# Patient Record
Sex: FEMALE | Race: WHITE | NOT HISPANIC OR LATINO | Employment: FULL TIME | URBAN - METROPOLITAN AREA
[De-identification: names, ages, dates, MRNs, and addresses within clinical notes are randomized per-mention and may not be internally consistent; named-entity substitution may affect disease eponyms.]

---

## 2018-09-02 ENCOUNTER — OFFICE VISIT (OUTPATIENT)
Dept: URGENT CARE | Facility: CLINIC | Age: 38
End: 2018-09-02
Payer: COMMERCIAL

## 2018-09-02 VITALS
WEIGHT: 239 LBS | BODY MASS INDEX: 40.8 KG/M2 | HEART RATE: 80 BPM | RESPIRATION RATE: 16 BRPM | TEMPERATURE: 98 F | DIASTOLIC BLOOD PRESSURE: 83 MMHG | HEIGHT: 64 IN | OXYGEN SATURATION: 100 % | SYSTOLIC BLOOD PRESSURE: 143 MMHG

## 2018-09-02 DIAGNOSIS — R30.0 DYSURIA: ICD-10-CM

## 2018-09-02 DIAGNOSIS — K52.9 GASTROENTERITIS: Primary | ICD-10-CM

## 2018-09-02 LAB
BILIRUB UR QL STRIP: NEGATIVE
GLUCOSE UR QL STRIP: NEGATIVE
KETONES UR QL STRIP: POSITIVE
LEUKOCYTE ESTERASE UR QL STRIP: NEGATIVE
PH, POC UA: 7 (ref 5–8)
POC BLOOD, URINE: NEGATIVE
POC NITRATES, URINE: NEGATIVE
PROT UR QL STRIP: NEGATIVE
SP GR UR STRIP: 1.01 (ref 1–1.03)
UROBILINOGEN UR STRIP-ACNC: ABNORMAL (ref 0.1–1.1)

## 2018-09-02 PROCEDURE — 81003 URINALYSIS AUTO W/O SCOPE: CPT | Mod: QW,S$GLB,, | Performed by: INTERNAL MEDICINE

## 2018-09-02 PROCEDURE — 99203 OFFICE O/P NEW LOW 30 MIN: CPT | Mod: 25,S$GLB,, | Performed by: INTERNAL MEDICINE

## 2018-09-02 RX ORDER — HYDROXYZINE PAMOATE 50 MG/1
50 CAPSULE ORAL EVERY 8 HOURS PRN
Qty: 25 CAPSULE | Refills: 0 | Status: SHIPPED | OUTPATIENT
Start: 2018-09-02 | End: 2018-12-27 | Stop reason: ALTCHOICE

## 2018-09-02 RX ORDER — SULFAMETHOXAZOLE AND TRIMETHOPRIM 800; 160 MG/1; MG/1
1 TABLET ORAL 2 TIMES DAILY
Qty: 10 TABLET | Refills: 0 | Status: SHIPPED | OUTPATIENT
Start: 2018-09-02 | End: 2018-09-07

## 2018-09-02 RX ORDER — ONDANSETRON 4 MG/1
4 TABLET, FILM COATED ORAL DAILY PRN
Qty: 10 TABLET | Refills: 0 | Status: SHIPPED | OUTPATIENT
Start: 2018-09-02 | End: 2018-12-27 | Stop reason: ALTCHOICE

## 2018-09-02 NOTE — PATIENT INSTRUCTIONS
Abdominal Pain    Abdominal pain is pain in the stomach or belly area. Everyone has this pain from time to time. In many cases it goes away on its own. But abdominal pain can sometimes be due to a serious problem, such as appendicitis. So its important to know when to seek help.  Causes of abdominal pain  There are many possible causes of abdominal pain. Common causes in adults include:  · Constipation, diarrhea, or gas  · Stomach acid flowing back up into the esophagus (acid reflux or heartburn)  · Severe acid reflux, called GERD (gastroesophageal reflux disease)  · A sore in the lining of the stomach or small intestine (peptic ulcer)  · Inflammation of the gallbladder, liver, or pancreas  · Gallstones or kidney stones  · Appendicitis   · Intestinal blockage   · An internal organ pushing through a muscle or other tissue (hernia)  · Urinary tract infections  · In women, menstrual cramps, fibroids, or endometriosis  · Inflammation or infection of the intestines  Diagnosing the cause of abdominal pain  Your healthcare provider will do a physical exam help find the cause of your pain. If needed, tests will be ordered. Belly pain has many possible causes. So it can be hard to find the reason for your pain. Giving details about your pain can help. Tell your provider where and when you feel the pain, and what makes it better or worse. Also let your provider know if you have other symptoms such as:  · Fever  · Tiredness  · Upset stomach (nausea)  · Vomiting  · Changes in bathroom habits  Treating abdominal pain  Some causes of pain need emergency medical treatment right away. These include appendicitis or a bowel blockage. Other problems can be treated with rest, fluids, or medicines. Your healthcare provider can give you specific instructions for treatment or self-care based on what is causing your pain.  If you have vomiting or diarrhea, sip water or other clear fluids. When you are ready to eat solid foods again,  start with small amounts of easy-to-digest, low-fat foods. These include apple sauce, toast, or crackers.   When to seek medical care  Call 911 or go to the hospital right away if you:  · Cant pass stool and are vomiting  · Are vomiting blood or have bloody diarrhea or black, tarry diarrhea  · Have chest, neck, or shoulder pain  · Feel like you might pass out  · Have pain in your shoulder blades with nausea  · Have sudden, severe belly pain  · Have new, severe pain unlike any you have felt before  · Have a belly that is rigid, hard, and tender to touch  Call your healthcare provider if you have:  · Pain for more than 5 days  · Bloating for more than 2 days  · Diarrhea for more than 5 days  · A fever of 100.4°F (38.0°C) or higher, or as directed by your provider  · Pain that gets worse  · Weight loss for no reason  · Continued lack of appetite  · Blood in your stool  How to prevent abdominal pain  Here are some tips to help prevent abdominal pain:  · Eat smaller amounts of food at one time.  · Avoid greasy, fried, or other high-fat foods.  · Avoid foods that give you gas.  · Exercise regularly.  · Drink plenty of fluids.  To help prevent GERD symptoms:  · Quit smoking.  · Reduce alcohol and certain foods that increase stomach acid.  · Avoid aspirin and over-the-counter pain and fever medicines (NSAIDS or nonsteroidal anti-inflammatory drugs), if possible  · Lose extra weight.  · Finish eating at least 2 hours before you go to bed or lie down.  · Raise the head of your bed.  Date Last Reviewed: 7/1/2016  © 9085-0929 [employer]. [address] [place] [address], [place] [address]. All rights reserved. This information is not intended as a substitute for professional medical care. Always follow your healthcare professional's instructions.  Please return here or go to the Emergency Department for any concerns or worsening of condition.  If you were prescribed antibiotics, please take them to completion.  If you  were prescribed a narcotic medication, do not drive or operate heavy equipment or machinery while taking these medications.  Please follow up with your primary care doctor or specialist as needed.    If you  smoke, please stop smoking.  1) Do not eat any solid foods until nausea, and vomiting have gone away for 16 hours.  2) You do not have to eat solid foods for a month, but you must drink liquids daily.  3) If not eating solid food you must drink liquids that have sugar in it, such as Gatorade and 7up (these two are very good for a sore stomach). Fluids with sugars will keep you from having a hunger headache, these associated with low blood sugars.  4) If no nausea and vomiting for 16 hours you may advance your diet to the well known BRAT diet (bananas, rice/mash potatoes [not spicy], apple sauce, and toast or crackers).  5) If diarrhea is present then do not eat fruits (especally apple sauce).  6) Diarrhea must pass, attempts to slow down the diarrhea can increase the length of the illness. So, to ensure that your bottom does not get chafed (the reason for this is because diarrhea is an acidic fluid) you must use a barrier cream such as Jarod's Butt Paste.  7) You must practice GOOD HAND HYGIENE(wwjd) in order not to spread this to others ESPECIALLY your FAMILY as this can cause a [place] OF DIARRHEA.

## 2018-09-02 NOTE — PROGRESS NOTES
"Subjective:       Patient ID: Sheryl Klein is a 38 y.o. female.    Vitals:  height is 5' 4" (1.626 m) and weight is 108.4 kg (239 lb). Her oral temperature is 97.8 °F (36.6 °C). Her blood pressure is 143/83 (abnormal) and her pulse is 80. Her respiration is 16 and oxygen saturation is 100%.     Chief Complaint: Abdominal Pain    Abdominal Pain   This is a new problem. The current episode started in the past 7 days. The problem occurs intermittently. The problem has been unchanged. The pain is located in the generalized abdominal region. The pain is at a severity of 7/10. The pain is moderate. The quality of the pain is aching. The abdominal pain does not radiate. Nothing aggravates the pain. The pain is relieved by nothing. She has tried antibiotics for the symptoms. The treatment provided no relief.     Review of Systems   Gastrointestinal: Positive for bloating and abdominal pain.       Objective:      Physical Exam   Constitutional: She is oriented to person, place, and time. She appears well-developed and well-nourished. She is cooperative.  Non-toxic appearance. She does not appear ill. No distress.   HENT:   Head: Normocephalic and atraumatic.   Right Ear: Hearing, tympanic membrane, external ear and ear canal normal.   Left Ear: Hearing, tympanic membrane, external ear and ear canal normal.   Nose: Nose normal. No mucosal edema, rhinorrhea or nasal deformity. No epistaxis. Right sinus exhibits no maxillary sinus tenderness and no frontal sinus tenderness. Left sinus exhibits no maxillary sinus tenderness and no frontal sinus tenderness.   Mouth/Throat: Uvula is midline, oropharynx is clear and moist and mucous membranes are normal. No trismus in the jaw. Normal dentition. No uvula swelling. No posterior oropharyngeal erythema.   Eyes: Conjunctivae and lids are normal. No scleral icterus.   Sclera clear bilat   Neck: Trachea normal, full passive range of motion without pain and phonation normal. Neck " supple.   Cardiovascular: Normal rate, regular rhythm, normal heart sounds, intact distal pulses and normal pulses.   Pulmonary/Chest: Effort normal and breath sounds normal. No respiratory distress.   Abdominal: Soft. Normal appearance and bowel sounds are normal. She exhibits no distension, no abdominal bruit, no pulsatile midline mass and no mass. There is no hepatosplenomegaly. There is no tenderness. There is no rigidity, no rebound, no guarding, no CVA tenderness, no tenderness at McBurney's point and negative Aquino's sign. No hernia.       Musculoskeletal: Normal range of motion. She exhibits no edema or deformity.   Neurological: She is alert and oriented to person, place, and time. She has normal strength. She exhibits normal muscle tone. Coordination normal.   Skin: Skin is warm, dry and intact. She is not diaphoretic. No pallor.   Psychiatric: She has a normal mood and affect. Her speech is normal and behavior is normal. Judgment and thought content normal. Cognition and memory are normal.   Nursing note and vitals reviewed.      Assessment:       1. Gastroenteritis        Plan:         Gastroenteritis  -     X-Ray Abdomen Flat And Erect; Future; Expected date: 09/02/2018

## 2018-09-05 ENCOUNTER — TELEPHONE (OUTPATIENT)
Dept: URGENT CARE | Facility: CLINIC | Age: 38
End: 2018-09-05

## 2018-12-27 ENCOUNTER — OFFICE VISIT (OUTPATIENT)
Dept: URGENT CARE | Facility: CLINIC | Age: 38
End: 2018-12-27
Payer: COMMERCIAL

## 2018-12-27 VITALS
HEART RATE: 83 BPM | SYSTOLIC BLOOD PRESSURE: 167 MMHG | WEIGHT: 224 LBS | BODY MASS INDEX: 38.24 KG/M2 | RESPIRATION RATE: 16 BRPM | TEMPERATURE: 99 F | DIASTOLIC BLOOD PRESSURE: 96 MMHG | OXYGEN SATURATION: 96 % | HEIGHT: 64 IN

## 2018-12-27 DIAGNOSIS — J01.10 ACUTE FRONTAL SINUSITIS, RECURRENCE NOT SPECIFIED: Primary | ICD-10-CM

## 2018-12-27 DIAGNOSIS — H65.02 ACUTE SEROUS OTITIS MEDIA OF LEFT EAR, RECURRENCE NOT SPECIFIED: ICD-10-CM

## 2018-12-27 PROCEDURE — 99214 OFFICE O/P EST MOD 30 MIN: CPT | Mod: S$GLB,,, | Performed by: INTERNAL MEDICINE

## 2018-12-27 RX ORDER — PREDNISONE 5 MG/1
5 TABLET ORAL DAILY
Qty: 10 TABLET | Refills: 0 | Status: SHIPPED | OUTPATIENT
Start: 2018-12-27 | End: 2019-06-01

## 2018-12-27 RX ORDER — CLINDAMYCIN HYDROCHLORIDE 75 MG/1
150 CAPSULE ORAL EVERY 6 HOURS
COMMUNITY
End: 2019-06-01

## 2018-12-27 NOTE — PROGRESS NOTES
"Subjective:       Patient ID: Sheryl Klein is a 38 y.o. female.    Vitals:  height is 5' 4" (1.626 m) and weight is 101.6 kg (224 lb). Her oral temperature is 99.1 °F (37.3 °C). Her blood pressure is 167/96 (abnormal) and her pulse is 83. Her respiration is 16 and oxygen saturation is 96%.     Chief Complaint: Cough    Cough   This is a new problem. The current episode started in the past 7 days. The problem has been gradually worsening. The cough is productive of purulent sputum. Associated symptoms include postnasal drip. Pertinent negatives include no chills, ear pain, eye redness, fever, hemoptysis, myalgias, rash, sore throat, shortness of breath or wheezing. Nothing aggravates the symptoms. The treatment provided no relief.       Constitution: Negative for chills, sweating, fatigue and fever.   HENT: Positive for postnasal drip. Negative for ear pain, congestion, sinus pain, sinus pressure, sore throat and voice change.    Neck: Negative for painful lymph nodes.   Eyes: Negative for eye redness.   Respiratory: Positive for cough and sputum production. Negative for chest tightness, bloody sputum, COPD, shortness of breath, stridor, wheezing and asthma.    Gastrointestinal: Negative for nausea and vomiting.   Musculoskeletal: Negative for muscle ache.   Skin: Negative for rash.   Allergic/Immunologic: Negative for seasonal allergies and asthma.   Hematologic/Lymphatic: Negative for swollen lymph nodes.       Objective:      Physical Exam   Constitutional: She is oriented to person, place, and time. She appears well-developed and well-nourished. She is cooperative.  Non-toxic appearance. She does not appear ill. No distress.   HENT:   Head: Normocephalic and atraumatic.   Right Ear: Hearing, tympanic membrane, external ear and ear canal normal. Tympanic membrane is not injected and not erythematous.   Left Ear: Hearing, external ear and ear canal normal. Tympanic membrane is injected and erythematous.   Nose: " Mucosal edema and rhinorrhea present. No nasal deformity. No epistaxis. Right sinus exhibits frontal sinus tenderness. Right sinus exhibits no maxillary sinus tenderness. Left sinus exhibits frontal sinus tenderness. Left sinus exhibits no maxillary sinus tenderness.   Mouth/Throat: Uvula is midline and mucous membranes are normal. No trismus in the jaw. Normal dentition. No uvula swelling. Posterior oropharyngeal edema (mild ) present. No oropharyngeal exudate or posterior oropharyngeal erythema.       Eyes: Conjunctivae and lids are normal. No scleral icterus.   Sclera clear bilat   Neck: Trachea normal, full passive range of motion without pain and phonation normal. Neck supple.   Cardiovascular: Normal rate, regular rhythm, normal heart sounds, intact distal pulses and normal pulses.   Pulmonary/Chest: Effort normal and breath sounds normal. No respiratory distress.   Abdominal: Soft. Normal appearance and bowel sounds are normal. She exhibits no distension. There is no tenderness.   Musculoskeletal: Normal range of motion. She exhibits no edema or deformity.   Neurological: She is alert and oriented to person, place, and time. She exhibits normal muscle tone. Coordination normal.   Skin: Skin is warm, dry and intact. She is not diaphoretic. No pallor.   Psychiatric: She has a normal mood and affect. Her speech is normal and behavior is normal. Judgment and thought content normal. Cognition and memory are normal.   Nursing note and vitals reviewed.      Assessment:       1. Acute frontal sinusitis, recurrence not specified    2. Acute serous otitis media of left ear, recurrence not specified        Plan:         Acute frontal sinusitis, recurrence not specified  -     predniSONE (DELTASONE) 5 MG tablet; Take 1 tablet (5 mg total) by mouth once daily.  Dispense: 10 tablet; Refill: 0    Acute serous otitis media of left ear, recurrence not specified  -     predniSONE (DELTASONE) 5 MG tablet; Take 1 tablet (5 mg  total) by mouth once daily.  Dispense: 10 tablet; Refill: 0       take meds

## 2018-12-27 NOTE — PATIENT INSTRUCTIONS
Acute Bacterial Rhinosinusitis (ABRS)    Acute bacterial rhinosinusitis (ABRS) is an infection of your nasal cavity and sinuses. Its caused by bacteria. Acute means that youve had symptoms for less than 12 weeks.  Understanding your sinuses  The nasal cavity is the large air-filled space behind your nose. The sinuses are a group of spaces formed by the bones of your face. They connect with your nasal cavity. ABRS causes the tissue lining these spaces to become inflamed. Mucus may not drain normally. This leads to facial pain and other symptoms.  What causes ABRS?  ABRS most often follows an upper respiratory infection caused by a virus. Bacteria then infect the lining of your nasal cavity and sinuses. But you can also get ABRS if you have:  · Nasal allergies  · Long-term nasal swelling and congestion not caused by allergies  · Blockage in the nose  Symptoms of ABRS  The symptoms of ABRS may be different for each person, and can include:  · Nasal congestion  · Runny nose  · Fluid draining from the nose down the throat (postnasal drip)  · Headache  · Cough  · Pain in the sinuses  · Thick, colored fluid from the nose (mucus)  · Fever  Diagnosing ABRS  ABRS may be diagnosed if youve had an upper respiratory infection like a cold and cough for longer than 10 to 14 days. Your health care provider will ask about your symptoms and your medical history. The provider will check your vital signs, including your temperature. Youll have a physical exam. The health care provider will check your ears, nose, and throat. You likely wont need any tests. If ABRS comes back, you may have a culture or other tests.  Treatment for ABRS  Treatment may include:  · Antibiotic medicine. This is for symptoms that last for at least 10 to 14 days.  · Nasal corticosteroid medicine. Drops or spray used in the nose can lessen swelling and congestion.  · Over-the-counter pain medicine. This is to lessen sinus pain and pressure.  · Nasal  decongestant medicine. Spray or drops may help to lessen congestion. Do not use them for more than a few days.  · Salt wash (saline irrigation). This can help to loosen mucus.  Possible complications of ABRS  ABRS may come back or become long-term (chronic).  In rare cases, ABRS may cause complications such as:   · Inflamed tissue around the brain and spinal cord (meningitis)  · Inflamed tissue around the eyes (orbital cellulitis)  · Inflamed bones around the sinuses (osteitis)  These problems may need to be treated in a hospital with intravenous (IV) antibiotic medicine or surgery.  When to call the health care provider  Call your health care provider if you have any of the following:  · Symptoms that dont get better, or get worse  · Symptoms that dont get better after 3 to 5 days on antibiotics  · Trouble seeing  · Swelling around your eyes  · Confusion or trouble staying awake   Date Last Reviewed: 3/3/2015  © 7063-0551 [employer]. [address] [place] [address]. All rights reserved. This information is not intended as a substitute for professional medical care. Always follow your healthcare professional's instructions.      Please return here or go to the Emergency Department for any concerns or worsening of condition.  If you were prescribed antibiotics, please take them to completion.  If you were prescribed a narcotic medication, do not drive or operate heavy equipment or machinery while taking these medications.  Please follow up with your primary care doctor or specialist as needed.    If you  smoke, please stop smoking.      1) Motrin/advil/ibuprofen- Take Two to Three Tablets(200 mg) three Times a Day for 5 to 7 Days.  2) Mucinex D 1/2 to 1 Tablet twice a day for 5 to 7 Days.  3) Drink Hot Liquids(coffee,WATER,Tea,Hot Chocolate,or Soup) that you put in a Mug place in Microwave for 2.5 to 3 minutes CHANGE THE CUP THAT WAS USED IN THE MICROWAVE SO AS NOT TO BURN YOUR MOUTH,then  sniff the steam from the cup and sip the heated liquid TEN TO TWELVE TIMES A DAY for 5 to 7 Days.  4) These 3 things will help the antibiotics and other medications work faster and will speed your recovery!  Continue clinda and eat yogurt

## 2018-12-30 ENCOUNTER — TELEPHONE (OUTPATIENT)
Dept: URGENT CARE | Facility: CLINIC | Age: 38
End: 2018-12-30

## 2018-12-30 NOTE — TELEPHONE ENCOUNTER
Pt is still not feeling better and still congested.  Pt will come back in for reevaluation if not better in 48 hours

## 2019-06-01 ENCOUNTER — HOSPITAL ENCOUNTER (INPATIENT)
Facility: OTHER | Age: 39
LOS: 2 days | Discharge: HOME | DRG: 743 | End: 2019-06-03
Attending: EMERGENCY MEDICINE | Admitting: OBSTETRICS & GYNECOLOGY
Payer: COMMERCIAL

## 2019-06-01 DIAGNOSIS — R10.2 PELVIC PAIN: ICD-10-CM

## 2019-06-01 DIAGNOSIS — N83.209 HEMORRHAGIC OVARIAN CYST: ICD-10-CM

## 2019-06-01 DIAGNOSIS — R10.9 ABDOMINAL PAIN, UNSPECIFIED ABDOMINAL LOCATION: ICD-10-CM

## 2019-06-01 DIAGNOSIS — N70.93 TUBO-OVARIAN ABSCESS: Primary | ICD-10-CM

## 2019-06-01 LAB
ALBUMIN SERPL BCP-MCNC: 3.4 G/DL (ref 3.5–5.2)
ALP SERPL-CCNC: 91 U/L (ref 55–135)
ALT SERPL W/O P-5'-P-CCNC: 14 U/L (ref 10–44)
ANION GAP SERPL CALC-SCNC: 12 MMOL/L (ref 8–16)
AST SERPL-CCNC: 20 U/L (ref 10–40)
B-HCG UR QL: NEGATIVE
BACTERIA #/AREA URNS AUTO: ABNORMAL /HPF
BASOPHILS # BLD AUTO: 0.04 K/UL (ref 0–0.2)
BASOPHILS NFR BLD: 0.3 % (ref 0–1.9)
BILIRUB SERPL-MCNC: 0.9 MG/DL (ref 0.1–1)
BILIRUB UR QL STRIP: NEGATIVE
BUN SERPL-MCNC: 11 MG/DL (ref 6–20)
CALCIUM SERPL-MCNC: 10.3 MG/DL (ref 8.7–10.5)
CHLORIDE SERPL-SCNC: 105 MMOL/L (ref 95–110)
CLARITY UR REFRACT.AUTO: ABNORMAL
CO2 SERPL-SCNC: 21 MMOL/L (ref 23–29)
COLOR UR AUTO: ABNORMAL
CREAT SERPL-MCNC: 0.6 MG/DL (ref 0.5–1.4)
CTP QC/QA: YES
DIFFERENTIAL METHOD: ABNORMAL
EOSINOPHIL # BLD AUTO: 0 K/UL (ref 0–0.5)
EOSINOPHIL NFR BLD: 0.1 % (ref 0–8)
ERYTHROCYTE [DISTWIDTH] IN BLOOD BY AUTOMATED COUNT: 12.6 % (ref 11.5–14.5)
EST. GFR  (AFRICAN AMERICAN): >60 ML/MIN/1.73 M^2
EST. GFR  (NON AFRICAN AMERICAN): >60 ML/MIN/1.73 M^2
GLUCOSE SERPL-MCNC: 103 MG/DL (ref 70–110)
GLUCOSE UR QL STRIP: NEGATIVE
HCT VFR BLD AUTO: 36.3 % (ref 37–48.5)
HGB BLD-MCNC: 11.7 G/DL (ref 12–16)
HGB UR QL STRIP: NEGATIVE
HYALINE CASTS UR QL AUTO: 0 /LPF
IMM GRANULOCYTES # BLD AUTO: 0.12 K/UL (ref 0–0.04)
IMM GRANULOCYTES NFR BLD AUTO: 0.8 % (ref 0–0.5)
KETONES UR QL STRIP: ABNORMAL
LACTATE SERPL-SCNC: 0.9 MMOL/L (ref 0.5–2.2)
LEUKOCYTE ESTERASE UR QL STRIP: NEGATIVE
LIPASE SERPL-CCNC: 6 U/L (ref 4–60)
LYMPHOCYTES # BLD AUTO: 0.8 K/UL (ref 1–4.8)
LYMPHOCYTES NFR BLD: 5.2 % (ref 18–48)
MCH RBC QN AUTO: 28 PG (ref 27–31)
MCHC RBC AUTO-ENTMCNC: 32.2 G/DL (ref 32–36)
MCV RBC AUTO: 87 FL (ref 82–98)
MICROSCOPIC COMMENT: ABNORMAL
MONOCYTES # BLD AUTO: 0.5 K/UL (ref 0.3–1)
MONOCYTES NFR BLD: 3.1 % (ref 4–15)
NEUTROPHILS # BLD AUTO: 14.1 K/UL (ref 1.8–7.7)
NEUTROPHILS NFR BLD: 90.5 % (ref 38–73)
NITRITE UR QL STRIP: NEGATIVE
NON-SQ EPI CELLS #/AREA URNS AUTO: 1 /HPF
NRBC BLD-RTO: 0 /100 WBC
PH UR STRIP: 5 [PH] (ref 5–8)
PLATELET # BLD AUTO: 351 K/UL (ref 150–350)
PMV BLD AUTO: 8.6 FL (ref 9.2–12.9)
POTASSIUM SERPL-SCNC: 4 MMOL/L (ref 3.5–5.1)
PROT SERPL-MCNC: 7.5 G/DL (ref 6–8.4)
PROT UR QL STRIP: ABNORMAL
RBC # BLD AUTO: 4.18 M/UL (ref 4–5.4)
RBC #/AREA URNS AUTO: 4 /HPF (ref 0–4)
SODIUM SERPL-SCNC: 138 MMOL/L (ref 136–145)
SP GR UR STRIP: >=1.03 (ref 1–1.03)
SQUAMOUS #/AREA URNS AUTO: 9 /HPF
URN SPEC COLLECT METH UR: ABNORMAL
WBC # BLD AUTO: 15.56 K/UL (ref 3.9–12.7)
WBC #/AREA URNS AUTO: 4 /HPF (ref 0–5)

## 2019-06-01 PROCEDURE — 63600175 PHARM REV CODE 636 W HCPCS: Performed by: STUDENT IN AN ORGANIZED HEALTH CARE EDUCATION/TRAINING PROGRAM

## 2019-06-01 PROCEDURE — 96361 HYDRATE IV INFUSION ADD-ON: CPT

## 2019-06-01 PROCEDURE — 83605 ASSAY OF LACTIC ACID: CPT

## 2019-06-01 PROCEDURE — 63600175 PHARM REV CODE 636 W HCPCS: Performed by: INTERNAL MEDICINE

## 2019-06-01 PROCEDURE — 99291 CRITICAL CARE FIRST HOUR: CPT | Mod: 25

## 2019-06-01 PROCEDURE — 25000003 PHARM REV CODE 250: Performed by: EMERGENCY MEDICINE

## 2019-06-01 PROCEDURE — 25000003 PHARM REV CODE 250: Performed by: STUDENT IN AN ORGANIZED HEALTH CARE EDUCATION/TRAINING PROGRAM

## 2019-06-01 PROCEDURE — 99223 PR INITIAL HOSPITAL CARE,LEVL III: ICD-10-PCS | Mod: ,,, | Performed by: OBSTETRICS & GYNECOLOGY

## 2019-06-01 PROCEDURE — 96365 THER/PROPH/DIAG IV INF INIT: CPT

## 2019-06-01 PROCEDURE — 87491 CHLMYD TRACH DNA AMP PROBE: CPT

## 2019-06-01 PROCEDURE — 11000001 HC ACUTE MED/SURG PRIVATE ROOM

## 2019-06-01 PROCEDURE — 85025 COMPLETE CBC W/AUTO DIFF WBC: CPT

## 2019-06-01 PROCEDURE — 99291 CRITICAL CARE FIRST HOUR: CPT | Mod: ,,, | Performed by: EMERGENCY MEDICINE

## 2019-06-01 PROCEDURE — 80053 COMPREHEN METABOLIC PANEL: CPT

## 2019-06-01 PROCEDURE — 96375 TX/PRO/DX INJ NEW DRUG ADDON: CPT

## 2019-06-01 PROCEDURE — 83690 ASSAY OF LIPASE: CPT

## 2019-06-01 PROCEDURE — 63600175 PHARM REV CODE 636 W HCPCS: Performed by: NURSE PRACTITIONER

## 2019-06-01 PROCEDURE — 96376 TX/PRO/DX INJ SAME DRUG ADON: CPT

## 2019-06-01 PROCEDURE — 87040 BLOOD CULTURE FOR BACTERIA: CPT

## 2019-06-01 PROCEDURE — 99223 1ST HOSP IP/OBS HIGH 75: CPT | Mod: ,,, | Performed by: OBSTETRICS & GYNECOLOGY

## 2019-06-01 PROCEDURE — 99291 PR CRITICAL CARE, E/M 30-74 MINUTES: ICD-10-PCS | Mod: ,,, | Performed by: EMERGENCY MEDICINE

## 2019-06-01 PROCEDURE — 25000003 PHARM REV CODE 250: Performed by: INTERNAL MEDICINE

## 2019-06-01 PROCEDURE — 81001 URINALYSIS AUTO W/SCOPE: CPT

## 2019-06-01 PROCEDURE — 81025 URINE PREGNANCY TEST: CPT | Performed by: EMERGENCY MEDICINE

## 2019-06-01 PROCEDURE — 96372 THER/PROPH/DIAG INJ SC/IM: CPT | Mod: 59

## 2019-06-01 RX ORDER — ONDANSETRON 2 MG/ML
8 INJECTION INTRAMUSCULAR; INTRAVENOUS
Status: COMPLETED | OUTPATIENT
Start: 2019-06-01 | End: 2019-06-01

## 2019-06-01 RX ORDER — HYDROMORPHONE HYDROCHLORIDE 1 MG/ML
1 INJECTION, SOLUTION INTRAMUSCULAR; INTRAVENOUS; SUBCUTANEOUS EVERY 4 HOURS PRN
Status: CANCELLED | OUTPATIENT
Start: 2019-06-01

## 2019-06-01 RX ORDER — KETOROLAC TROMETHAMINE 30 MG/ML
10 INJECTION, SOLUTION INTRAMUSCULAR; INTRAVENOUS
Status: DISCONTINUED | OUTPATIENT
Start: 2019-06-01 | End: 2019-06-01

## 2019-06-01 RX ORDER — ONDANSETRON 4 MG/1
8 TABLET, ORALLY DISINTEGRATING ORAL
Status: ON HOLD | COMMUNITY
End: 2019-06-03 | Stop reason: HOSPADM

## 2019-06-01 RX ORDER — ONDANSETRON 2 MG/ML
4 INJECTION INTRAMUSCULAR; INTRAVENOUS
Status: COMPLETED | OUTPATIENT
Start: 2019-06-01 | End: 2019-06-01

## 2019-06-01 RX ORDER — HYDROCODONE BITARTRATE AND ACETAMINOPHEN 5; 325 MG/1; MG/1
1 TABLET ORAL EVERY 6 HOURS PRN
Status: DISCONTINUED | OUTPATIENT
Start: 2019-06-01 | End: 2019-06-03 | Stop reason: HOSPADM

## 2019-06-01 RX ORDER — HYDROMORPHONE HYDROCHLORIDE 1 MG/ML
1 INJECTION, SOLUTION INTRAMUSCULAR; INTRAVENOUS; SUBCUTANEOUS
Status: COMPLETED | OUTPATIENT
Start: 2019-06-01 | End: 2019-06-01

## 2019-06-01 RX ORDER — DEXTROSE MONOHYDRATE, SODIUM CHLORIDE, AND POTASSIUM CHLORIDE 50; 1.49; 9 G/1000ML; G/1000ML; G/1000ML
INJECTION, SOLUTION INTRAVENOUS CONTINUOUS
Status: DISCONTINUED | OUTPATIENT
Start: 2019-06-01 | End: 2019-06-02

## 2019-06-01 RX ORDER — MORPHINE SULFATE 2 MG/ML
6 INJECTION, SOLUTION INTRAMUSCULAR; INTRAVENOUS
Status: DISCONTINUED | OUTPATIENT
Start: 2019-06-01 | End: 2019-06-01

## 2019-06-01 RX ORDER — IBUPROFEN 600 MG/1
600 TABLET ORAL EVERY 6 HOURS
Status: DISCONTINUED | OUTPATIENT
Start: 2019-06-01 | End: 2019-06-02

## 2019-06-01 RX ORDER — SODIUM CHLORIDE 0.9 % (FLUSH) 0.9 %
10 SYRINGE (ML) INJECTION
Status: DISCONTINUED | OUTPATIENT
Start: 2019-06-01 | End: 2019-06-03 | Stop reason: HOSPADM

## 2019-06-01 RX ORDER — ONDANSETRON 8 MG/1
8 TABLET, ORALLY DISINTEGRATING ORAL EVERY 8 HOURS PRN
Status: DISCONTINUED | OUTPATIENT
Start: 2019-06-01 | End: 2019-06-03 | Stop reason: HOSPADM

## 2019-06-01 RX ORDER — HYDROCODONE BITARTRATE AND ACETAMINOPHEN 10; 325 MG/1; MG/1
1 TABLET ORAL EVERY 6 HOURS PRN
Status: DISCONTINUED | OUTPATIENT
Start: 2019-06-01 | End: 2019-06-03 | Stop reason: HOSPADM

## 2019-06-01 RX ORDER — CALCIUM CARBONATE 200(500)MG
1000 TABLET,CHEWABLE ORAL DAILY PRN
Status: DISCONTINUED | OUTPATIENT
Start: 2019-06-01 | End: 2019-06-03 | Stop reason: HOSPADM

## 2019-06-01 RX ORDER — CEFOXITIN SODIUM 1 G/50ML
2 INJECTION, SOLUTION INTRAVENOUS
Status: DISCONTINUED | OUTPATIENT
Start: 2019-06-01 | End: 2019-06-01

## 2019-06-01 RX ORDER — IBUPROFEN 600 MG/1
600 TABLET ORAL EVERY 6 HOURS PRN
Status: DISCONTINUED | OUTPATIENT
Start: 2019-06-01 | End: 2019-06-01

## 2019-06-01 RX ORDER — OXYCODONE AND ACETAMINOPHEN 2.5; 325 MG/1; MG/1
1 TABLET ORAL EVERY 4 HOURS PRN
Status: ON HOLD | COMMUNITY
End: 2019-06-03 | Stop reason: HOSPADM

## 2019-06-01 RX ORDER — DIPHENHYDRAMINE HYDROCHLORIDE 50 MG/ML
12.5 INJECTION INTRAMUSCULAR; INTRAVENOUS
Status: COMPLETED | OUTPATIENT
Start: 2019-06-01 | End: 2019-06-01

## 2019-06-01 RX ORDER — CEFOXITIN SODIUM 2 G/50ML
2 INJECTION, SOLUTION INTRAVENOUS
Status: DISCONTINUED | OUTPATIENT
Start: 2019-06-01 | End: 2019-06-02

## 2019-06-01 RX ADMIN — DOXYCYCLINE 100 MG: 100 INJECTION, POWDER, LYOPHILIZED, FOR SOLUTION INTRAVENOUS at 05:06

## 2019-06-01 RX ADMIN — CEFOXITIN SODIUM 2 G: 2 INJECTION, SOLUTION INTRAVENOUS at 10:06

## 2019-06-01 RX ADMIN — HYDROMORPHONE HYDROCHLORIDE 1 MG: 1 INJECTION, SOLUTION INTRAMUSCULAR; INTRAVENOUS; SUBCUTANEOUS at 02:06

## 2019-06-01 RX ADMIN — CEFOXITIN 2 G: 2 INJECTION, POWDER, FOR SOLUTION INTRAVENOUS at 05:06

## 2019-06-01 RX ADMIN — ONDANSETRON 8 MG: 2 INJECTION INTRAMUSCULAR; INTRAVENOUS at 12:06

## 2019-06-01 RX ADMIN — CALCIUM CARBONATE 1000 MG: 500 TABLET, CHEWABLE ORAL at 09:06

## 2019-06-01 RX ADMIN — HYDROMORPHONE HYDROCHLORIDE 1 MG: 1 INJECTION, SOLUTION INTRAMUSCULAR; INTRAVENOUS; SUBCUTANEOUS at 11:06

## 2019-06-01 RX ADMIN — ONDANSETRON 8 MG: 8 TABLET, ORALLY DISINTEGRATING ORAL at 09:06

## 2019-06-01 RX ADMIN — ONDANSETRON 8 MG: 2 INJECTION INTRAMUSCULAR; INTRAVENOUS at 02:06

## 2019-06-01 RX ADMIN — SODIUM CHLORIDE, SODIUM LACTATE, POTASSIUM CHLORIDE, AND CALCIUM CHLORIDE 1000 ML: .6; .31; .03; .02 INJECTION, SOLUTION INTRAVENOUS at 05:06

## 2019-06-01 RX ADMIN — DIPHENHYDRAMINE HYDROCHLORIDE 12.5 MG: 50 INJECTION, SOLUTION INTRAMUSCULAR; INTRAVENOUS at 06:06

## 2019-06-01 RX ADMIN — HYDROMORPHONE HYDROCHLORIDE 1 MG: 1 INJECTION, SOLUTION INTRAMUSCULAR; INTRAVENOUS; SUBCUTANEOUS at 06:06

## 2019-06-01 RX ADMIN — POTASSIUM CHLORIDE, DEXTROSE MONOHYDRATE AND SODIUM CHLORIDE: 150; 5; 900 INJECTION, SOLUTION INTRAVENOUS at 07:06

## 2019-06-01 RX ADMIN — ONDANSETRON 4 MG: 2 INJECTION INTRAMUSCULAR; INTRAVENOUS at 06:06

## 2019-06-01 RX ADMIN — SODIUM CHLORIDE 1000 ML: 0.9 INJECTION, SOLUTION INTRAVENOUS at 11:06

## 2019-06-01 RX ADMIN — SODIUM CHLORIDE, SODIUM LACTATE, POTASSIUM CHLORIDE, AND CALCIUM CHLORIDE 1000 ML: .6; .31; .03; .02 INJECTION, SOLUTION INTRAVENOUS at 02:06

## 2019-06-01 NOTE — PROVIDER PROGRESS NOTES - EMERGENCY DEPT.
Encounter Date: 6/1/2019    ED Physician Progress Notes       SCRIBE NOTE: I, Paul Chou, am scribing for, and in the presence of,  Wally Alejandre MD.  Physician Statement: I, Wally Alejandre MD, personally performed the services described in this documentation as scribed by Paul Chou in my presence, and it is both accurate and complete.     Physician Note:   Patient with abdominal pain for a few weeks now that became dramatically worse yesterday. She went to an outside ED and had an extensive work up, revealing that she has a cyst or a mass on her ovary. She is now having upper abdominal pain. Patient had a gastric sleeve procedure done in the last 6 months. She will need an extensive work up and consultation.

## 2019-06-01 NOTE — ED TRIAGE NOTES
Pt reports ABD pain since yesterday was seen in [facility] dx with right ovarian cyst with need for follow up with Gyn, unable to see primary office closed.

## 2019-06-01 NOTE — ED PROVIDER NOTES
"Encounter Date: 6/1/2019       History     Chief Complaint   Patient presents with    Abdominal Pain     Pt with abd pain, had ct scan yesterday and was found to have a mass in abd.  Pt was told to come to ED for evaluation.     Ms. Klein  is a pleasant 37 yo lady w/ h/o ovarian cysts and morbid obesity s/p gastric sleeve 6 mo ago presenting to the ED c/o abdominal pain.  Two weeks ago she developed a sharp suprapubic intermittent abdominal pain, 5/10, controlled w/ Tylenol and associated with an aching R sided flank pain and dysuria unlike her previous urinary tract infections.  Despite conservative therapy w/ Tylenol her pain persisted so she went to urgent care last weekend where the did a POC UA that was negative for evidence of infection or hematuria.  Afterwards she resumed her Tylenol for pain control and her Sx were unchanged until this last Friday (5/31) she had an "episode" where her abdominal pain suddenly progressed from 5/10 to 10/10, the pain generalized to the entire abdomen, associated with nausea and lightheadedness, and made worse w/ po water & deep inspiration.   [place] she was found to have a complex r adnexal mass on doppler US c/w complex ovarian cyst.  Per patient, the US also showed evidence of a ruptured hemorrhagic L ovarian cyst but no evidence of torsion on either side.  In the ED they were able to control her pain w/ oxy 5 and discharged her to see her outpatient gynecologist later that day.  When she went for her appointment the office was closed 2/2 to the MD needed to delivery.  This morning she reports having gross hematuria and decided to present to [facility] for further evaluation.        Review of patient's allergies indicates:   Allergen Reactions    Penicillins      History reviewed. No pertinent past medical history.  Past Surgical History:   Procedure Laterality Date    SLEEVE GASTROPLASTY      TONSILLECTOMY       Family History "   Problem Relation Age of Onset    Diabetes Father      Social History     Tobacco Use    Smoking status: Never Smoker   Substance Use Topics    Alcohol use: Not on file    Drug use: Not on file     Review of Systems   Constitutional: Positive for diaphoresis. Negative for chills and fever.   HENT: Negative for ear pain, hearing loss, mouth sores and sore throat.    Eyes: Negative for photophobia, pain and visual disturbance.   Respiratory: Negative for apnea, cough, chest tightness and shortness of breath.    Cardiovascular: Negative for chest pain and palpitations.   Gastrointestinal: Positive for abdominal pain, nausea and vomiting. Negative for abdominal distention, anal bleeding, blood in stool, constipation, diarrhea and rectal pain.   Genitourinary: Positive for dysuria, flank pain, hematuria and pelvic pain. Negative for difficulty urinating, genital sores, menstrual problem, urgency, vaginal bleeding, vaginal discharge and vaginal pain.   Musculoskeletal: Negative for arthralgias and myalgias.   Skin: Negative for rash and wound.   Neurological: Positive for light-headedness. Negative for dizziness, syncope and numbness.       Physical Exam     Initial Vitals [06/01/19 1036]   BP Pulse Resp Temp SpO2   (!) 141/68 88 16 98.5 °F (36.9 °C) 98 %      MAP       --         Physical Exam    Constitutional: She appears well-developed and well-nourished. She appears ill. She appears distressed.   Cardiovascular: Normal rate, regular rhythm and intact distal pulses. Exam reveals no gallop and no friction rub.    No murmur heard.  Pulmonary/Chest: Breath sounds normal. No tachypnea. No respiratory distress. She has no decreased breath sounds. She has no wheezes. She has no rhonchi. She has no rales.   Abdominal: Soft. She exhibits no distension. Bowel sounds are decreased. There is generalized tenderness and tenderness in the right lower quadrant. There is no rigidity and no guarding.   Diffuse tenderness to light  palpations in all 4 quadrants w/ RLQ eliciting the most pain   Musculoskeletal: She exhibits no edema or tenderness.   Neurological: She is alert and oriented to person, place, and time.         ED Course   Procedures  Labs Reviewed   CBC W/ AUTO DIFFERENTIAL - Abnormal; Notable for the following components:       Result Value    WBC 15.56 (*)     Hemoglobin 11.7 (*)     Hematocrit 36.3 (*)     Platelets 351 (*)     MPV 8.6 (*)     Immature Granulocytes 0.8 (*)     Gran # (ANC) 14.1 (*)     Immature Grans (Abs) 0.12 (*)     Lymph # 0.8 (*)     Gran% 90.5 (*)     Lymph% 5.2 (*)     Mono% 3.1 (*)     All other components within normal limits   COMPREHENSIVE METABOLIC PANEL - Abnormal; Notable for the following components:    CO2 21 (*)     Albumin 3.4 (*)     All other components within normal limits   URINALYSIS, REFLEX TO URINE CULTURE - Abnormal; Notable for the following components:    Appearance, UA Hazy (*)     Specific Gravity, UA >=1.030 (*)     Protein, UA 1+ (*)     Ketones, UA 1+ (*)     All other components within normal limits    Narrative:     Preferred Collection Type->Urine, Clean Catch   URINALYSIS MICROSCOPIC - Abnormal; Notable for the following components:    Bacteria Moderate (*)     Non-Squam Epith 1 (*)     All other components within normal limits    Narrative:     Preferred Collection Type->Urine, Clean Catch   LIPASE   LACTIC ACID, PLASMA   POCT URINE PREGNANCY          Imaging Results          US Pelvis Comp with Transvag NON-OB (xpd) (Final result)  Result time 06/01/19 14:41:43   Procedure changed from US Pelvis Complete Non OB     Final result by Julian Montoya MD (06/01/19 14:41:43)                 Impression:      Bilateral large complex adnexal fluid collections and complex free fluid throughout the pelvis.  Findings are concerning for tubo-ovarian abscesses versus hemorrhagic cysts.  Clinical correlation and further evaluation as warranted.    Probable endometrial  polyp.    Electronically signed by resident: Jamie Fernandes  Date:    06/01/2019  Time:    14:30    Electronically signed by: Julian Montoya MD  Date:    06/01/2019  Time:    14:41             Narrative:    EXAMINATION:  US PELVIS COMP WITH TRANSVAG NON-OB (XPD)    CLINICAL HISTORY:  Concern for ovarian torsion    TECHNIQUE:  Transabdominal sonography of the pelvis was performed, followed by transvaginal sonography to better evaluate the uterus and ovaries.    COMPARISON:  None.    FINDINGS:  Uterus:    Size: 7.6 x 4.2 x 5.8 cm    Masses: No masses within the uterine parenchyma.    Endometrium: Endometrium is normal in caliber measuring 5 mm.  6 x 4 mm hyperechoic focus within the endometrium near the fundus, potentially representing a polyp.    Complex fluid collections within both adnexal regions measuring 8.5 x 6.5 x 6.2 cm on the right and 4.1 x 2.1 x 3.5 cm on the left.  The ovarian tissue is difficult to delineate from the complex fluid collections however, the adnexa do not appear enlarged.  There is normal arterial and venous flow to the adnexal regions without evidence of torsion.    Free Fluid:    Complex free fluid throughout the pelvis separate from these adnexal fluid collections.                                 Medical Decision Making:   History:   Old Medical Records: I decided to obtain old medical records.  Initial Assessment:   39 yo WF w/ diffuse abdominal pain  Differential Diagnosis:   DDx includes nephrolithiasis vs ruptured ovarian cyst vs gastric sleeve dehiscence  Clinical Tests:   Lab Tests: Ordered and Reviewed  Radiological Study: Ordered and Reviewed  Medical Tests: Ordered and Reviewed  ED Management:  1:10 PM  UA negative for gross hematuria making nephrolithiasis less likely.  Suspect current presentation is 2/2 to the ruptured cyst.  We have contacted OSH for a copy of the US & Abd CT report from yesterday and waiting for a reply.  Repeat Pelvic US w/ doppler pending.    2:53 PM  UA  wnl w/o any hematuria but w/ elevated specific gravity - will give another 1 L IV bolus.  Pelvic US shows complex free fluid throughout the pelvis and BL ovarian cystic masses (8.5x6.5x6.2 on L & 4.1x2.1x3.5 on R).  After speaking with OSH, they will be faxing over CT report w/n next 15 minutes.    3:30 PM  Given the ultrasound findings and clinical presentation we will transfer the Ms. Klein to [facility] for further management as it is highly unlikely this is a gastric sleeve dehiscence.  This case was discussed with general surgery who agreed with our plan.    4:40 PM  Dr. Chaparro with gynecology has accepted Ms. Klein and she will be transferred to [facility] for further management.  Per Gyn's request, we have started her on cefotixitin and doxy.  Per patient she has a remote history of a rash when she was given PCN when she was younger but she denies any dyspnea or swelling 2/2 to PCN.  Other:   I have discussed this case with another health care provider.              Attending Attestation:   Physician Attestation Statement for Resident:  As the supervising MD   Physician Attestation Statement: I have personally seen and examined this patient.   I agree with the above history. -: 38-year-old woman complains of generalized abdominal pain.  She reports that her pain started approximately 1 week ago in the right flank and then radiated to the right lower quadrant.  It was just a dull ache at 1st but became much more severe suddenly yesterday when it doubled her over in pain and became associated with vomiting and was more localized to the right lower quadrant at that time.  She was seen at an outside ED yesterday where they did a CT scan with IV contrast.  She reports that on that CT scan they told her she had a large mass near her right ovary or right uterus which prompted them to do an ultrasound.  On that ultrasound they told her that she had cystic structures near both ovaries and fluid in her pelvis.   Her pain was controlled with IV Dilaudid and she was discharged to her OB GYNs office however he was not able to see her because he had to go deliver a baby.  She denies any fevers or chills.  No change in appetite until yesterday when the pain became more severe and she has had vomiting. She tells me she has been pregnant twice and has 2 children.  States the bumps in the road on the way here made her pain worse.  States that since discharge yesterday her pain has worsened overnight.  It is not controlled by the oxycodone that she was discharged with.   As the supervising MD I agree with the above PE.   -: On exam, patient is in severe pain.  Prefers to be sitting up in the bed.  On abdominal exam abdomen is soft but there is diffuse generalized tenderness. Positive rebound. No guarding.   As the supervising MD I agree with the above treatment, course, plan, and disposition.   -: Patient has a history of a gastric sleeve 6 months ago however I do not feel that this is related to this our differential included ovarian torsion, renal colic, pyelonephritis, TOA, ruptured ovarian cyst, hemorrhagic cyst, PID.  Internal hernia related to her gastric sleeve is also possibility mom however given her recent diagnosis of pelvic masses this seems to be more likely a pelvic issue.  Her urinalysis returned unremarkable and we started with a pelvic ultrasound.  We also attempted to obtain records from the outside facility.  We did bolus her with IV fluids and attempted to control her pain and nausea.  Pelvic ultrasound returned with large cystic masses and complex fluid in her pelvis.  Once we obtained her records from the outside facility were able to see that the amount of fluid in her pelvis has increased from yesterday.  Her CT scan as well is unremarkable other than the pelvic findings.  We did discuss this with General surgery resident on-call who agreed that these complaints seem unrelated to her gastric sleeve.   Therefore we discussed the case with OB gyn at [facility] who agreed that she seemed appropriate for transfer for admission to their service.  I feel these findings are consistent with ruptured hemorrhagic cysts.  Patient has remained stable while in the ED however she has a potential for deterioration with this increase in fluid in her pelvis on ultrasound.  We have maintained fluid resuscitation while in the ED.  She continues to be nauseous however.        Attending Critical Care:   Critical Care Times:   Direct Patient Care (initial evaluation, reassessments, and time considering the case)................................................................15 minutes.   Additional History from reviewing old medical records or taking additional history from the family, EMS, PCP, etc.......................5 minutes.   Ordering, Reviewing, and Interpreting Diagnostic Studies...............................................................................................................5 minutes.   Documentation..................................................................................................................................................................................5 minutes.   ==============================================================  · Total Critical Care Time - exclusive of procedural time: 30 minutes.  ==============================================================  Critical Care Condition: potentially life-threatening                  Clinical Impression:       ICD-10-CM ICD-9-CM   1. Hemorrhagic ovarian cyst N83.209 620.2         Disposition:   Disposition: Transferred  Condition: Stable                        Bill Jasmine MD  Resident  06/01/19 1643       Arlene Robles MD  06/02/19 0725

## 2019-06-01 NOTE — ED PROVIDER NOTES
Encounter Date: 6/1/2019       History     Chief Complaint   Patient presents with    Abdominal Pain     Pt with abd pain, had ct scan yesterday and was found to have a mass in abd.  Pt was told to come to ED for evaluation.     Patient is a 38-year-old female with no significant medical history presenting to the ED as a transfer from [facility] due to pelvic US showing complex free fluid throughout the pelvis and BL ovarian cystic masses.  Patient to be seen by Gynecology.    The history is provided by the patient and medical records.     Review of patient's allergies indicates:   Allergen Reactions    Penicillins Rash     History reviewed. No pertinent past medical history.  Past Surgical History:   Procedure Laterality Date    SLEEVE GASTROPLASTY      TONSILLECTOMY       Family History   Problem Relation Age of Onset    Diabetes Father      Social History     Tobacco Use    Smoking status: Never Smoker   Substance Use Topics    Alcohol use: Not on file    Drug use: Not on file     Review of Systems   Constitutional: Positive for activity change, appetite change and fatigue. Negative for chills and fever.   HENT: Negative for sore throat.    Respiratory: Negative for shortness of breath.    Cardiovascular: Negative for chest pain.   Gastrointestinal: Positive for abdominal pain, nausea and vomiting. Negative for constipation and diarrhea.   Genitourinary: Positive for difficulty urinating, dysuria, flank pain, hematuria and pelvic pain.   Musculoskeletal: Positive for myalgias ( Generalized). Negative for arthralgias and back pain.   Skin: Negative for rash.   Allergic/Immunologic: Negative.    Neurological: Positive for dizziness and light-headedness. Negative for syncope, weakness and headaches.   Hematological: Does not bruise/bleed easily.   All other systems reviewed and are negative.      Physical Exam     Initial Vitals [06/01/19 1036]   BP Pulse Resp Temp SpO2   (!) 141/68 88 16 98.5 °F  (36.9 °C) 98 %      MAP       --         Physical Exam    Nursing note and vitals reviewed.  Constitutional: She appears well-developed and well-nourished. She is cooperative. She does not have a sickly appearance. She does not appear ill. No distress.   HENT:   Head: Normocephalic and atraumatic.   Eyes: EOM and lids are normal. Pupils are equal, round, and reactive to light.   Neck: Trachea normal and phonation normal.   Cardiovascular: Normal rate, regular rhythm and intact distal pulses.   Pulses:       Radial pulses are 2+ on the right side, and 2+ on the left side.   Pulmonary/Chest: Effort normal and breath sounds normal. She has no wheezes. She has no rhonchi.   Abdominal: Soft. Normal appearance and bowel sounds are normal. There is tenderness. There is no rigidity, no rebound, no guarding and no CVA tenderness.   Diffuse tenderness to light palpations in all 4 quadrants w/ RLQ eliciting the most pain    Neurological: She is alert and oriented to person, place, and time. She has normal strength. No sensory deficit. GCS eye subscore is 4. GCS verbal subscore is 5. GCS motor subscore is 6.   Skin: Skin is warm, dry and intact. Capillary refill takes 2 to 3 seconds. No abrasion, no laceration and no rash noted. There is pallor.   Psychiatric: She has a normal mood and affect.         ED Course   Procedures  Labs Reviewed   CBC W/ AUTO DIFFERENTIAL - Abnormal; Notable for the following components:       Result Value    WBC 15.56 (*)     Hemoglobin 11.7 (*)     Hematocrit 36.3 (*)     Platelets 351 (*)     MPV 8.6 (*)     Immature Granulocytes 0.8 (*)     Gran # (ANC) 14.1 (*)     Immature Grans (Abs) 0.12 (*)     Lymph # 0.8 (*)     Gran% 90.5 (*)     Lymph% 5.2 (*)     Mono% 3.1 (*)     All other components within normal limits   COMPREHENSIVE METABOLIC PANEL - Abnormal; Notable for the following components:    CO2 21 (*)     Albumin 3.4 (*)     All other components within normal limits   URINALYSIS, REFLEX TO  URINE CULTURE - Abnormal; Notable for the following components:    Appearance, UA Hazy (*)     Specific Gravity, UA >=1.030 (*)     Protein, UA 1+ (*)     Ketones, UA 1+ (*)     All other components within normal limits    Narrative:     Preferred Collection Type->Urine, Clean Catch   URINALYSIS MICROSCOPIC - Abnormal; Notable for the following components:    Bacteria Moderate (*)     Non-Squam Epith 1 (*)     All other components within normal limits    Narrative:     Preferred Collection Type->Urine, Clean Catch   CULTURE, BLOOD   CULTURE, BLOOD   C. TRACHOMATIS/N. GONORRHOEAE BY AMP DNA   LIPASE   LACTIC ACID, PLASMA   POCT URINE PREGNANCY          Imaging Results          US Pelvis Comp with Transvag NON-OB (xpd) (Final result)  Result time 06/01/19 14:41:43   Procedure changed from US Pelvis Complete Non OB     Final result by Julian Montoya MD (06/01/19 14:41:43)                 Impression:      Bilateral large complex adnexal fluid collections and complex free fluid throughout the pelvis.  Findings are concerning for tubo-ovarian abscesses versus hemorrhagic cysts.  Clinical correlation and further evaluation as warranted.    Probable endometrial polyp.    Electronically signed by resident: Jamie Fernandes  Date:    06/01/2019  Time:    14:30    Electronically signed by: Julian oMntoya MD  Date:    06/01/2019  Time:    14:41             Narrative:    EXAMINATION:  US PELVIS COMP WITH TRANSVAG NON-OB (XPD)    CLINICAL HISTORY:  Concern for ovarian torsion    TECHNIQUE:  Transabdominal sonography of the pelvis was performed, followed by transvaginal sonography to better evaluate the uterus and ovaries.    COMPARISON:  None.    FINDINGS:  Uterus:    Size: 7.6 x 4.2 x 5.8 cm    Masses: No masses within the uterine parenchyma.    Endometrium: Endometrium is normal in caliber measuring 5 mm.  6 x 4 mm hyperechoic focus within the endometrium near the fundus, potentially representing a polyp.    Complex fluid  collections within both adnexal regions measuring 8.5 x 6.5 x 6.2 cm on the right and 4.1 x 2.1 x 3.5 cm on the left.  The ovarian tissue is difficult to delineate from the complex fluid collections however, the adnexa do not appear enlarged.  There is normal arterial and venous flow to the adnexal regions without evidence of torsion.    Free Fluid:    Complex free fluid throughout the pelvis separate from these adnexal fluid collections.                                 Medical Decision Making:   Initial Assessment:   Emergent evaluation of a 37 yo female patient presenting to the ER with chief complaint of right lower quadrant abdominal pain, ovarian cyst.  Patient was seen [facility] and found to have bilateral ovarian cyst.  Concern for ruptured cyst.  On exam patient is A&O x3. Patient is pale.  Abdomen soft with generalized tenderness noted to the right lower quadrant. .      Differential Diagnosis:   Differential diagnoses include but are not limited to hemorrhagic ovarian cyst, ruptured ovarian cyst, abdominal abscess.    ED Management:  I will give the medication and consult Gynecology.    Gynecology consulted will be down to see patient.     Patient to be admitted to gynecology service.  IV pain medications ordered.  Zofran ordered.  Benadryl ordered.  Patient family updated on plan of care.  All questions and concerns addressed.                      Clinical Impression:       ICD-10-CM ICD-9-CM   1. Hemorrhagic ovarian cyst N83.209 620.2   2. Tubo-ovarian abscess N70.93 614.2         Disposition:   Disposition: Admitted  Condition: Stable                        Trena Cardoso NP  06/01/19 8781

## 2019-06-02 LAB
ANISOCYTOSIS BLD QL SMEAR: SLIGHT
BASOPHILS # BLD AUTO: ABNORMAL K/UL (ref 0–0.2)
BASOPHILS NFR BLD: 1 % (ref 0–1.9)
DACRYOCYTES BLD QL SMEAR: ABNORMAL
DIFFERENTIAL METHOD: ABNORMAL
EOSINOPHIL # BLD AUTO: ABNORMAL K/UL (ref 0–0.5)
EOSINOPHIL NFR BLD: 0 % (ref 0–8)
ERYTHROCYTE [DISTWIDTH] IN BLOOD BY AUTOMATED COUNT: 12.9 % (ref 11.5–14.5)
HCT VFR BLD AUTO: 30.5 % (ref 37–48.5)
HGB BLD-MCNC: 9.9 G/DL (ref 12–16)
HYPOCHROMIA BLD QL SMEAR: ABNORMAL
INR PPP: 1 (ref 0.8–1.2)
LYMPHOCYTES # BLD AUTO: ABNORMAL K/UL (ref 1–4.8)
LYMPHOCYTES NFR BLD: 17 % (ref 18–48)
MCH RBC QN AUTO: 27.8 PG (ref 27–31)
MCHC RBC AUTO-ENTMCNC: 32.5 G/DL (ref 32–36)
MCV RBC AUTO: 86 FL (ref 82–98)
MONOCYTES # BLD AUTO: ABNORMAL K/UL (ref 0.3–1)
MONOCYTES NFR BLD: 6 % (ref 4–15)
NEUTROPHILS NFR BLD: 75 % (ref 38–73)
NEUTS BAND NFR BLD MANUAL: 1 %
PLATELET # BLD AUTO: 276 K/UL (ref 150–350)
PMV BLD AUTO: 7.8 FL (ref 9.2–12.9)
PROTHROMBIN TIME: 11.4 SEC (ref 9–12.5)
RBC # BLD AUTO: 3.56 M/UL (ref 4–5.4)
WBC # BLD AUTO: 7.48 K/UL (ref 3.9–12.7)

## 2019-06-02 PROCEDURE — 85027 COMPLETE CBC AUTOMATED: CPT

## 2019-06-02 PROCEDURE — 25500020 PHARM REV CODE 255: Performed by: OBSTETRICS & GYNECOLOGY

## 2019-06-02 PROCEDURE — S0028 INJECTION, FAMOTIDINE, 20 MG: HCPCS | Performed by: STUDENT IN AN ORGANIZED HEALTH CARE EDUCATION/TRAINING PROGRAM

## 2019-06-02 PROCEDURE — 99152 MOD SED SAME PHYS/QHP 5/>YRS: CPT | Performed by: RADIOLOGY

## 2019-06-02 PROCEDURE — 63600175 PHARM REV CODE 636 W HCPCS: Performed by: STUDENT IN AN ORGANIZED HEALTH CARE EDUCATION/TRAINING PROGRAM

## 2019-06-02 PROCEDURE — 25000003 PHARM REV CODE 250: Performed by: STUDENT IN AN ORGANIZED HEALTH CARE EDUCATION/TRAINING PROGRAM

## 2019-06-02 PROCEDURE — 63600175 PHARM REV CODE 636 W HCPCS: Performed by: RADIOLOGY

## 2019-06-02 PROCEDURE — 36415 COLL VENOUS BLD VENIPUNCTURE: CPT

## 2019-06-02 PROCEDURE — 63600175 PHARM REV CODE 636 W HCPCS: Performed by: INTERNAL MEDICINE

## 2019-06-02 PROCEDURE — 85610 PROTHROMBIN TIME: CPT

## 2019-06-02 PROCEDURE — 85007 BL SMEAR W/DIFF WBC COUNT: CPT

## 2019-06-02 PROCEDURE — S0077 INJECTION, CLINDAMYCIN PHOSP: HCPCS | Performed by: STUDENT IN AN ORGANIZED HEALTH CARE EDUCATION/TRAINING PROGRAM

## 2019-06-02 PROCEDURE — 11000001 HC ACUTE MED/SURG PRIVATE ROOM

## 2019-06-02 RX ORDER — FAMOTIDINE 10 MG/ML
20 INJECTION INTRAVENOUS 2 TIMES DAILY
Status: DISCONTINUED | OUTPATIENT
Start: 2019-06-02 | End: 2019-06-03 | Stop reason: HOSPADM

## 2019-06-02 RX ORDER — CLINDAMYCIN PHOSPHATE 900 MG/50ML
900 INJECTION, SOLUTION INTRAVENOUS
Status: DISCONTINUED | OUTPATIENT
Start: 2019-06-02 | End: 2019-06-03 | Stop reason: HOSPADM

## 2019-06-02 RX ORDER — ACETAMINOPHEN 325 MG/1
650 TABLET ORAL EVERY 8 HOURS PRN
Status: DISCONTINUED | OUTPATIENT
Start: 2019-06-02 | End: 2019-06-03 | Stop reason: HOSPADM

## 2019-06-02 RX ORDER — MIDAZOLAM HYDROCHLORIDE 1 MG/ML
INJECTION INTRAMUSCULAR; INTRAVENOUS
Status: DISCONTINUED | OUTPATIENT
Start: 2019-06-02 | End: 2019-06-02 | Stop reason: HOSPADM

## 2019-06-02 RX ORDER — FENTANYL CITRATE 50 UG/ML
INJECTION, SOLUTION INTRAMUSCULAR; INTRAVENOUS
Status: DISCONTINUED | OUTPATIENT
Start: 2019-06-02 | End: 2019-06-02 | Stop reason: HOSPADM

## 2019-06-02 RX ADMIN — DOXYCYCLINE 100 MG: 100 INJECTION, POWDER, LYOPHILIZED, FOR SOLUTION INTRAVENOUS at 07:06

## 2019-06-02 RX ADMIN — GENTAMICIN SULFATE 349.2 MG: 40 INJECTION, SOLUTION INTRAMUSCULAR; INTRAVENOUS at 05:06

## 2019-06-02 RX ADMIN — CEFOXITIN SODIUM 2 G: 2 INJECTION, SOLUTION INTRAVENOUS at 04:06

## 2019-06-02 RX ADMIN — IOHEXOL 100 ML: 350 INJECTION, SOLUTION INTRAVENOUS at 08:06

## 2019-06-02 RX ADMIN — HYDROCODONE BITARTRATE AND ACETAMINOPHEN 1 TABLET: 10; 325 TABLET ORAL at 06:06

## 2019-06-02 RX ADMIN — FAMOTIDINE 20 MG: 10 INJECTION, SOLUTION INTRAVENOUS at 04:06

## 2019-06-02 RX ADMIN — FAMOTIDINE 20 MG: 10 INJECTION, SOLUTION INTRAVENOUS at 09:06

## 2019-06-02 RX ADMIN — POTASSIUM CHLORIDE, DEXTROSE MONOHYDRATE AND SODIUM CHLORIDE: 150; 5; 900 INJECTION, SOLUTION INTRAVENOUS at 04:06

## 2019-06-02 RX ADMIN — HYDROCODONE BITARTRATE AND ACETAMINOPHEN 1 TABLET: 5; 325 TABLET ORAL at 12:06

## 2019-06-02 RX ADMIN — IOHEXOL 25 ML: 350 INJECTION, SOLUTION INTRAVENOUS at 08:06

## 2019-06-02 RX ADMIN — CLINDAMYCIN IN 5 PERCENT DEXTROSE 900 MG: 18 INJECTION, SOLUTION INTRAVENOUS at 04:06

## 2019-06-02 RX ADMIN — ACETAMINOPHEN 650 MG: 325 TABLET, FILM COATED ORAL at 09:06

## 2019-06-02 NOTE — H&P
H&P  Gynecology    SUBJECTIVE:     History of Present Illness:  Sheryl Klein is a 38 y.o.  who presents as a transfer from [facility] [place]. Pt initially presented with abdominal pain and intermittent suprapubic pain to an [facility] where underwent she underwent imaging revealing a complex ovarian cyst on the right and a ruptured hemorrhagic cyst on the left. Pain was controlled with PO pain medication and the patient was discharged to follow up with her GYN. Patient then had an episode of hematuria this AM which prompted her to present to [facility] [place]. TVUS showed evidence of bilateral complex fluid collections with presence of free fluid. Pt also had elevated white count and was transferred to [facility] for concern of bilateral TOAs    On presentation, pt is complaining of continued abdominal pain, nausea/vomiting. Endorses hematuria and dysuria. Denies fever/chills.     Gyn history  Follows with GYN in [facility], normal pap within the last 6 months  Denies history of abnormal paps or excision procedures  Denies history of STDs    Ob history  G1: elective   G2:  at term  G3:  at term    Review of patient's allergies indicates:   Allergen Reactions    Penicillins Rash     History reviewed. No pertinent past medical history.  Past Surgical History:   Procedure Laterality Date    SLEEVE GASTROPLASTY      TONSILLECTOMY       OB History    None       Family History   Problem Relation Age of Onset    Diabetes Father      Social History     Socioeconomic History    Marital status:      Spouse name: Not on file    Number of children: Not on file    Years of education: Not on file    Highest education level: Not on file   Occupational History    Not on file   Social Needs    Financial resource strain: Not on file    Food insecurity:     Worry: Not on file     Inability: Not on file    Transportation needs:     Medical: Not on file     Non-medical: Not on file   Tobacco Use     Smoking status: Never Smoker   Substance and Sexual Activity    Alcohol use: Not on file    Drug use: Not on file    Sexual activity: Not on file   Lifestyle    Physical activity:     Days per week: Not on file     Minutes per session: Not on file    Stress: Not on file   Relationships    Social connections:     Talks on phone: Not on file     Gets together: Not on file     Attends Orthodoxy service: Not on file     Active member of club or organization: Not on file     Attends meetings of clubs or organizations: Not on file     Relationship status: Not on file   Other Topics Concern    Not on file   Social History Narrative    Not on file     Current Facility-Administered Medications   Medication    ceFOXItin injection 2 g    dextrose 5 % and 0.9 % NaCl with KCl 20 mEq infusion    doxycycline (VIBRAMYCIN) 100mg in dextrose 5% 250 mL IVPB (ready to mix)    HYDROcodone-acetaminophen  mg per tablet 1 tablet    HYDROcodone-acetaminophen 5-325 mg per tablet 1 tablet    ibuprofen tablet 600 mg    lactated ringers bolus 1,000 mL    ondansetron disintegrating tablet 8 mg    promethazine (PHENERGAN) 12.5 mg in dextrose 5 % 50 mL IVPB    promethazine (PHENERGAN) 12.5 mg in dextrose 5 % 50 mL IVPB    sodium chloride 0.9% flush 10 mL     Current Outpatient Medications   Medication Sig    ondansetron (ZOFRAN-ODT) 4 MG TbDL Take 8 mg by mouth every 12 (twelve) hours.    oxyCODONE-acetaminophen (PERCOCET) 2.5-325 mg per tablet Take 1 tablet by mouth every 4 (four) hours as needed for Pain.       Review of Systems:  Constitutional: no significant weight change, fever, fatigue  Eyes:  No vision changes  Cardiovascular: No chest pain  Respiratory: No shortness of breath or cough  Gastrointestinal: No diarrhea, bloody stool, constipation  Genitourinary: positive for painful urination, frequent urination, hematuria  Skin/Breast: No painful breasts, nipple discharge, masses  Neurological: No  headache  Endocrine: No hot flushes  Psychiatric: No depression or anxiety     OBJECTIVE:     Vital Signs  Temp:  [98.5 °F (36.9 °C)] 98.5 °F (36.9 °C)  Pulse:  [75-88] 88  Resp:  [15-18] 18  SpO2:  [95 %-98 %] 98 %  BP: (119-141)/(61-74) 137/69    Physical Exam:  Gen: A&Ox3, NAD, obese  CV: RRR  Pulm: LCTAB  Abd: Soft, non-distended, moderately tender to palpation, no rebound, no guarding  Ext: PPP, no peripheral edema  External genitalia: WNL  Urethra and Meatus: WNL  Vagina: Moist, pink, normal ruggae, clear/milky white discharge  Cervix: WNL, positive CMT  Uterus: difficult to assess secondary to body habitus  Adnexa: TTP bilaterally     Laboratory  Recent Results (from the past 24 hour(s))   POCT urine pregnancy    Collection Time: 06/01/19 11:03 AM   Result Value Ref Range    POC Preg Test, Ur Negative Negative     Acceptable Yes    Urinalysis, Reflex to Urine Culture Urine, Clean Catch    Collection Time: 06/01/19 11:09 AM   Result Value Ref Range    Specimen UA Urine, Clean Catch     Color, UA Susan Yellow, Straw, Susan    Appearance, UA Hazy (A) Clear    pH, UA 5.0 5.0 - 8.0    Specific Gravity, UA >=1.030 (A) 1.005 - 1.030    Protein, UA 1+ (A) Negative    Glucose, UA Negative Negative    Ketones, UA 1+ (A) Negative    Bilirubin (UA) Negative Negative    Occult Blood UA Negative Negative    Nitrite, UA Negative Negative    Leukocytes, UA Negative Negative   Urinalysis Microscopic    Collection Time: 06/01/19 11:09 AM   Result Value Ref Range    RBC, UA 4 0 - 4 /hpf    WBC, UA 4 0 - 5 /hpf    Bacteria Moderate (A) None-Occ /hpf    Squam Epithel, UA 9 /hpf    Non-Squam Epith 1 (A) <1/hpf /hpf    Hyaline Casts, UA 0 0-1/lpf /lpf    Microscopic Comment SEE COMMENT    CBC W/ AUTO DIFFERENTIAL    Collection Time: 06/01/19 11:13 AM   Result Value Ref Range    WBC 15.56 (H) 3.90 - 12.70 K/uL    RBC 4.18 4.00 - 5.40 M/uL    Hemoglobin 11.7 (L) 12.0 - 16.0 g/dL    Hematocrit 36.3 (L) 37.0 - 48.5 %     Mean Corpuscular Volume 87 82 - 98 fL    Mean Corpuscular Hemoglobin 28.0 27.0 - 31.0 pg    Mean Corpuscular Hemoglobin Conc 32.2 32.0 - 36.0 g/dL    RDW 12.6 11.5 - 14.5 %    Platelets 351 (H) 150 - 350 K/uL    MPV 8.6 (L) 9.2 - 12.9 fL    Immature Granulocytes 0.8 (H) 0.0 - 0.5 %    Gran # (ANC) 14.1 (H) 1.8 - 7.7 K/uL    Immature Grans (Abs) 0.12 (H) 0.00 - 0.04 K/uL    Lymph # 0.8 (L) 1.0 - 4.8 K/uL    Mono # 0.5 0.3 - 1.0 K/uL    Eos # 0.0 0.0 - 0.5 K/uL    Baso # 0.04 0.00 - 0.20 K/uL    nRBC 0 0 /100 WBC    Gran% 90.5 (H) 38.0 - 73.0 %    Lymph% 5.2 (L) 18.0 - 48.0 %    Mono% 3.1 (L) 4.0 - 15.0 %    Eosinophil% 0.1 0.0 - 8.0 %    Basophil% 0.3 0.0 - 1.9 %    Differential Method Automated    Comp. Metabolic Panel    Collection Time: 06/01/19 11:13 AM   Result Value Ref Range    Sodium 138 136 - 145 mmol/L    Potassium 4.0 3.5 - 5.1 mmol/L    Chloride 105 95 - 110 mmol/L    CO2 21 (L) 23 - 29 mmol/L    Glucose 103 70 - 110 mg/dL    BUN, Bld 11 6 - 20 mg/dL    Creatinine 0.6 0.5 - 1.4 mg/dL    Calcium 10.3 8.7 - 10.5 mg/dL    Total Protein 7.5 6.0 - 8.4 g/dL    Albumin 3.4 (L) 3.5 - 5.2 g/dL    Total Bilirubin 0.9 0.1 - 1.0 mg/dL    Alkaline Phosphatase 91 55 - 135 U/L    AST 20 10 - 40 U/L    ALT 14 10 - 44 U/L    Anion Gap 12 8 - 16 mmol/L    eGFR if African American >60.0 >60 mL/min/1.73 m^2    eGFR if non African American >60.0 >60 mL/min/1.73 m^2   Lipase    Collection Time: 06/01/19 11:13 AM   Result Value Ref Range    Lipase 6 4 - 60 U/L   Lactic acid, plasma    Collection Time: 06/01/19 11:47 AM   Result Value Ref Range    Lactate (Lactic Acid) 0.9 0.5 - 2.2 mmol/L       Diagnostic Results:  Imaging Results          US Pelvis Comp with Transvag NON-OB (xpd) (Final result)  Result time 06/01/19 14:41:43   Procedure changed from US Pelvis Complete Non OB     Final result by Julian Montoya MD (06/01/19 14:41:43)                 Impression:      Bilateral large complex adnexal fluid collections and  complex free fluid throughout the pelvis.  Findings are concerning for tubo-ovarian abscesses versus hemorrhagic cysts.  Clinical correlation and further evaluation as warranted.    Probable endometrial polyp.    Electronically signed by resident: Jamie Fernandes  Date:    2019  Time:    14:30    Electronically signed by: Julian Montoya MD  Date:    2019  Time:    14:41             Narrative:    EXAMINATION:  US PELVIS COMP WITH TRANSVAG NON-OB (XPD)    CLINICAL HISTORY:  Concern for ovarian torsion    TECHNIQUE:  Transabdominal sonography of the pelvis was performed, followed by transvaginal sonography to better evaluate the uterus and ovaries.    COMPARISON:  None.    FINDINGS:  Uterus:    Size: 7.6 x 4.2 x 5.8 cm    Masses: No masses within the uterine parenchyma.    Endometrium: Endometrium is normal in caliber measuring 5 mm.  6 x 4 mm hyperechoic focus within the endometrium near the fundus, potentially representing a polyp.    Complex fluid collections within both adnexal regions measuring 8.5 x 6.5 x 6.2 cm on the right and 4.1 x 2.1 x 3.5 cm on the left.  The ovarian tissue is difficult to delineate from the complex fluid collections however, the adnexa do not appear enlarged.  There is normal arterial and venous flow to the adnexal regions without evidence of torsion.    Free Fluid:    Complex free fluid throughout the pelvis separate from these adnexal fluid collections.                                  ASSESSMENT/PLAN:     Active Hospital Problems    Diagnosis  POA    Tubo-ovarian abscess [N70.93]  Yes      Resolved Hospital Problems   No resolved problems to display.       Sheryl Klein is a 38 y.o.  who presents with complex fluid collections    1. TOA  - TVUS with bilateral complex fluid collections; 8.5x6.5x6.2 on the right and 4.1x2.1x3.5 on the left  - elevated WBC of 15.56  - concerning for TOA  - will get CT A/P to further characterize fluid collections  - IR consult, coags  in the AM  - NPO at midnight  - IV cefoxitin/doxycycline  - zofran/phenergan PRN  - ibuprofen scheduled  - norco 5/10 PRN for pain      Marti Ritchie MD   [facility], PGY-1

## 2019-06-02 NOTE — PLAN OF CARE
06/02/19 1707   Discharge Assessment   Assessment Type Discharge Planning Assessment   Confirmed/corrected address and phone number on facesheet? Yes   Assessment information obtained from? Patient   Communicated expected length of stay with patient/caregiver yes   Prior to hospitilization cognitive status: Alert/Oriented   Prior to hospitalization functional status: Independent   Current cognitive status: Alert/Oriented   Current Functional Status: Independent   Lives With alone   Is patient able to care for self after discharge? Yes   Patient's perception of discharge disposition admitted as an inpatient   Patient currently being followed by outpatient case management? Yes   Patient currently receives any other outside agency services? Yes   Is it the patient/care giver preference to resume care with the current outside agency? Yes   Equipment Currently Used at Home none   Do you have any problems affording any of your prescribed medications? TBD   Is the patient taking medications as prescribed? yes   Does the patient have transportation home? Yes   Does the patient receive services at the [facility]? No   Discharge Plan A Home with family   Discharge Plan B Home with family   DME Needed Upon Discharge  none   Patient/Family in Agreement with Plan yes        RNCM met with patient at the bedside.      PT REQUEST PCP @ [facility]      Patient is alert and oriented with no communication barriers.      Prior to admission patient was independent with ADLs. Patient denies the use of HH or DME .       Patients PCP is correct on the face sheet. [facility] pharmacy is [facility]     Patient denies a history of mental illness.         Patients family will transport him home at discharge.         No CM needs identified at this time.       CM team will continue to follow.

## 2019-06-02 NOTE — NURSING
Pt refused her Cefoxitin stating she had a reaction to it in the ED and had to be given Diphenhydramine IV. She also refused her Ibuprofen tablet stating she had a gastric sleeve placed in the past and was told she could not take Ibuprofen. Paged OBGYN resident to inform. José Manuel Abdi returned paged and stated they were in surgery but would give them the message. Will continue to monitor.

## 2019-06-02 NOTE — NURSING
"Pt C/O "severe reflux". Dr. Smith notified. Also had questions regarding repeat CT planned for this morning. Orders given for Pepcid IV. Pt and spouse updated on POC.  "

## 2019-06-02 NOTE — PLAN OF CARE
Problem: Adult Inpatient Plan of Care  Goal: Absence of Hospital-Acquired Illness or Injury  Outcome: Ongoing (interventions implemented as appropriate)  Pt continues antibiotic therapy. Pain medications was given per patient request for nausea w/out vomiting and pain in abdomen. Pt complained of burning chest pains. Resident was notified and medications was prescribed and administered. Pt currently drinking contrast for CT scan. No further complaints at this time.

## 2019-06-02 NOTE — ASSESSMENT & PLAN NOTE
TVUS with bilateral complex fluid collections; 8.5x6.5x6.2 on the right and 4.1x2.1x3.5 on the left  - elevated WBC of 15.56. Afebrile. Labs pending this morning.  - concerning for TOA  - will get CT A/P to further characterize fluid collections  - IR consult, coags in the AM  - NPO at midnight  - IV cefoxitin/doxycycline  - zofran/phenergan PRN  - ibuprofen scheduled  - norco 5/10 PRN for pain  - GC/CT pending

## 2019-06-02 NOTE — ED NOTES
Report given to Mariama RN   Viral uri symptoms, myalgias, chills cough sneezing  Others at group home with same  Recovering addict, benzos, meth          Review of Systems   Constitutional: Positive for activity change. HENT: Positive for congestion, facial swelling, rhinorrhea, sinus pressure, sinus pain and sneezing. Negative for ear discharge. Respiratory: Positive for cough. Cardiovascular: Negative. Gastrointestinal: Negative. Genitourinary: Negative. Musculoskeletal: Positive for myalgias. Skin: Negative. Neurological: Negative. Hematological: Negative. Psychiatric/Behavioral: Negative. All other systems reviewed and are negative. Physical Exam   Constitutional: He is oriented to person, place, and time. He appears well-developed. HENT:   Head: Normocephalic. Nose: Mucosal edema and rhinorrhea present. Eyes: Pupils are equal, round, and reactive to light. Neck: Normal range of motion. Cardiovascular: Normal rate and regular rhythm. Pulmonary/Chest: Effort normal and breath sounds normal.   Abdominal: Soft. Musculoskeletal: Normal range of motion. Neurological: He is alert and oriented to person, place, and time. Skin: Skin is warm. Psychiatric: He has a normal mood and affect. Nursing note and vitals reviewed. Procedures    [facility]        --------------------------------------------- PAST HISTORY ---------------------------------------------  Past Medical History:  has a past medical history of Bee sting allergy, Raynaud phenomenon, and Sciatica. Past Surgical History:  has no past surgical history on file. Social History:  reports that he has been smoking cigarettes. He has a 4.00 pack-year smoking history. He has never used smokeless tobacco. He reports that he drinks about 1.2 oz of alcohol per week. He reports that he has current or past drug history. Drug: Marijuana. Family History: family history is not on file.      The patients home medications have been

## 2019-06-02 NOTE — CONSULTS
Inpatient Radiology Pre-procedure Note    History of Present Illness:  Sheryl Klein is a 38 y.o. female with pertinent PMHx of acute onset of abdominal and suprapubic pelvic pain with imaging showing bilateral complex fluid collections in the adnexa associated with complex free fluid in the pelvis concerning for hemorrhagic cyst vs TOA's. IR consulted to assess for potential image-guided aspiration +/- drainage catheter placement into these bilateral adnexal collections.      Admission H&P reviewed.  History reviewed. No pertinent past medical history.  Past Surgical History:   Procedure Laterality Date    SLEEVE GASTROPLASTY      TONSILLECTOMY         Review of Systems:   As documented in primary team H&P    Home Meds:   Prior to Admission medications    Medication Sig Start Date End Date Taking? Authorizing Provider   ondansetron (ZOFRAN-ODT) 4 MG TbDL Take 8 mg by mouth every 12 (twelve) hours.   Yes Historical Provider, MD   oxyCODONE-acetaminophen (PERCOCET) 2.5-325 mg per tablet Take 1 tablet by mouth every 4 (four) hours as needed for Pain.   Yes Historical Provider, MD     Scheduled Meds:    clindamycin (CLEOCIN) IVPB  900 mg Intravenous Q8H    famotidine (PF)  20 mg Intravenous BID    gentamicin  5 mg/kg (Adjusted) Intravenous Once    ibuprofen  600 mg Oral Q6H     Continuous Infusions:    dextrose 5 % and 0.9 % NaCl with KCl 20 mEq 125 mL/hr at 06/02/19 0426     PRN Meds:calcium carbonate, HYDROcodone-acetaminophen, HYDROcodone-acetaminophen, ondansetron, promethazine (PHENERGAN) IVPB, sodium chloride 0.9%  Anticoagulants/Antiplatelets: no anticoagulation    Allergies:   Review of patient's allergies indicates:   Allergen Reactions    Cefoxitin Hives    Penicillins Rash     Sedation Hx: have not been any systemic reactions    Labs:  Recent Labs   Lab 06/02/19  0408   INR 1.0       Recent Labs   Lab 06/02/19  0408   WBC 7.48   HGB 9.9*   HCT 30.5*   MCV 86         Recent Labs   Lab  06/01/19  1113         K 4.0      CO2 21*   BUN 11   CREATININE 0.6   CALCIUM 10.3   ALT 14   AST 20   ALBUMIN 3.4*   BILITOT 0.9         Vitals:  Temp: 97.9 °F (36.6 °C) (06/02/19 1205)  Pulse: 70 (06/02/19 1205)  Resp: 16 (06/02/19 1205)  BP: 115/67 (06/02/19 1205)  SpO2: 99 % (06/02/19 1205)     Physical Exam:  ASA: II  Mallampati: IIII    General: distressed  Mental Status: alert and oriented to person, place and time  HEENT: normocephalic, atraumatic  Chest: unlabored breathing  Heart: regular heart rate  Abdomen: nondistended. TTP.  Extremity: moves all extremities    A/P:   39 y/o F with complex fluid collections in the bilateral adnexa.    1. Left adnexal collection with no safe window for percutaneous access and is to small to accept a drainage catheter. Right adnexal collection with small window for percutaneous access at time of scanning and likely sizable to accept drainage catheter. Plan for CT-guided aspiration of right adnexal collection if safe window still present at time of pre-procedural scan. If delano blood aspirated suggesting contained hemorrhagic cyst, will collect sample and forgo drainage catheter placement which could potentially exacerbate an ongoing adnexal bleed. If fluid other than delano blood aspirated, will attempt placement of a rt adnexal collection drainage catheter if possible. Moderate conscious sedation will be utilized.    Thank you for considering IR for the care of your patient.     Darius Agee MD  Interventional Radiology

## 2019-06-02 NOTE — PROGRESS NOTES
[facility]  Obstetrics & Gynecology  Progress Note    Patient Name: Sheryl Klein  MRN: [id number]  Admission Date: 2019  Primary Care Provider: Primary Doctor No  Principal Problem: <principal problem not specified>    Subjective:     HPI:  Sheryl Klein is a 38 y.o.  who presents as a transfer from [facility]. Pt initially presented with abdominal pain and intermittent suprapubic pain to an [facility] where underwent she underwent imaging revealing a complex ovarian cyst on the right and a ruptured hemorrhagic cyst on the left. Pain was controlled with PO pain medication and the patient was discharged to follow up with her GYN. Patient then had an episode of hematuria this AM which prompted her to present to [facility]. TVUS showed evidence of bilateral complex fluid collections with presence of free fluid. Pt also had elevated white count and was transferred to [facility] for concern of bilateral TOAs     On presentation, pt is complaining of continued abdominal pain, nausea/vomiting. Endorses hematuria and dysuria. Denies fever/chills.      Gyn history  Follows with GYN in [facility], normal pap within the last 6 months  Denies history of abnormal paps or excision procedures  Denies history of STDs     Ob history  G1: elective   G2:  at term  G3:  at term    Interval History:   Pt feeling alright this morning. Pain better controlled. Has not had any further emesis since admission. Has not had an appetite. Urinating and ambulating without difficulty    Scheduled Meds:   ceFOXItin (MEFOXIN) IVPB  2 g Intravenous Q6H    doxycycline (VIBRAMYCIN) IVPB  100 mg Intravenous Q12H    famotidine (PF)  20 mg Intravenous BID    ibuprofen  600 mg Oral Q6H    promethazine (PHENERGAN) IVPB  12.5 mg Intravenous ED 1 Time     Continuous Infusions:   dextrose 5 % and 0.9 % NaCl with KCl 20 mEq 125 mL/hr at 19 0426     PRN Meds:calcium carbonate,  HYDROcodone-acetaminophen, HYDROcodone-acetaminophen, ondansetron, promethazine (PHENERGAN) IVPB, sodium chloride 0.9%    Review of patient's allergies indicates:   Allergen Reactions    Penicillins Rash       Objective:     Vital Signs (Most Recent):  Temp: 98.1 °F (36.7 °C) (06/02/19 0352)  Pulse: 78 (06/02/19 0352)  Resp: 16 (06/02/19 0352)  BP: (!) 123/55 (06/02/19 0352)  SpO2: 97 % (06/02/19 0352) Vital Signs (24h Range):  Temp:  [97.8 °F (36.6 °C)-98.5 °F (36.9 °C)] 98.1 °F (36.7 °C)  Pulse:  [73-88] 78  Resp:  [15-18] 16  SpO2:  [95 %-98 %] 97 %  BP: (119-141)/(55-74) 123/55     Weight: 96 kg (211 lb 10.3 oz)  Body mass index is 37.49 kg/m².  Patient's last menstrual period was 05/25/2019.    I&O (Last 24H):    Intake/Output Summary (Last 24 hours) at 6/2/2019 0528  Last data filed at 6/2/2019 0200  Gross per 24 hour   Intake 2170 ml   Output 400 ml   Net 1770 ml       Physical Exam:   Constitutional: She is oriented to person, place, and time. She appears well-developed and well-nourished. No distress.    HENT:   Head: Normocephalic and atraumatic.    Eyes: EOM are normal.    Neck: Normal range of motion.    Cardiovascular: Normal rate, regular rhythm, normal heart sounds and intact distal pulses.     Pulmonary/Chest: Effort normal and breath sounds normal.        Abdominal: Soft. She exhibits no distension. There is tenderness (bialteral lower quadrants). There is no rebound and no guarding.             Musculoskeletal: Normal range of motion. She exhibits no edema or tenderness.       Neurological: She is alert and oriented to person, place, and time.    Skin: Skin is warm and dry. She is not diaphoretic.    Psychiatric: She has a normal mood and affect.       Laboratory:  CBC:   Recent Labs   Lab 06/01/19  1113   WBC 15.56*   RBC 4.18   HGB 11.7*   HCT 36.3*   *   MCV 87   MCH 28.0   MCHC 32.2     CMP:   Recent Labs   Lab 06/01/19  1113      CALCIUM 10.3   ALBUMIN 3.4*   PROT 7.5       K 4.0   CO2 21*      BUN 11   CREATININE 0.6   ALKPHOS 91   ALT 14   AST 20   BILITOT 0.9     Urine Pregnancy Test: No results for input(s): PREGTESTUR in the last 48 hours.    Diagnostic Results:  TVUS   FINDINGS:  Uterus:    Size: 7.6 x 4.2 x 5.8 cm    Masses: No masses within the uterine parenchyma.    Endometrium: Endometrium is normal in caliber measuring 5 mm.  6 x 4 mm hyperechoic focus within the endometrium near the fundus, potentially representing a polyp.    Complex fluid collections within both adnexal regions measuring 8.5 x 6.5 x 6.2 cm on the right and 4.1 x 2.1 x 3.5 cm on the left.  The ovarian tissue is difficult to delineate from the complex fluid collections however, the adnexa do not appear enlarged.  There is normal arterial and venous flow to the adnexal regions without evidence of torsion.    Free Fluid:    Complex free fluid throughout the pelvis separate from these adnexal fluid collections.      Impression       Bilateral large complex adnexal fluid collections and complex free fluid throughout the pelvis.  Findings are concerning for tubo-ovarian abscesses versus hemorrhagic cysts.  Clinical correlation and further evaluation as warranted.    Probable endometrial polyp.         Assessment/Plan:     Tubo-ovarian abscess  TVUS with bilateral complex fluid collections; 8.5x6.5x6.2 on the right and 4.1x2.1x3.5 on the left  - elevated WBC of 15.56. Afebrile. Labs pending this morning.  - concerning for TOA  - will get CT A/P to further characterize fluid collections  - IR consult, coags in the AM  - NPO at midnight  - IV cefoxitin/doxycycline  - zofran/phenergan PRN  - ibuprofen scheduled  - norco 5/10 PRN for pain  - GC/CT pending        Nanette Smith MD  Obstetrics & Gynecology  [facility]

## 2019-06-02 NOTE — SUBJECTIVE & OBJECTIVE
Interval History:   Pt feeling alright this morning. Pain better controlled. Has not had any further emesis since admission. Has not had an appetite. Urinating and ambulating without difficulty    Scheduled Meds:   ceFOXItin (MEFOXIN) IVPB  2 g Intravenous Q6H    doxycycline (VIBRAMYCIN) IVPB  100 mg Intravenous Q12H    famotidine (PF)  20 mg Intravenous BID    ibuprofen  600 mg Oral Q6H    promethazine (PHENERGAN) IVPB  12.5 mg Intravenous ED 1 Time     Continuous Infusions:   dextrose 5 % and 0.9 % NaCl with KCl 20 mEq 125 mL/hr at 06/02/19 0426     PRN Meds:calcium carbonate, HYDROcodone-acetaminophen, HYDROcodone-acetaminophen, ondansetron, promethazine (PHENERGAN) IVPB, sodium chloride 0.9%    Review of patient's allergies indicates:   Allergen Reactions    Penicillins Rash       Objective:     Vital Signs (Most Recent):  Temp: 98.1 °F (36.7 °C) (06/02/19 0352)  Pulse: 78 (06/02/19 0352)  Resp: 16 (06/02/19 0352)  BP: (!) 123/55 (06/02/19 0352)  SpO2: 97 % (06/02/19 0352) Vital Signs (24h Range):  Temp:  [97.8 °F (36.6 °C)-98.5 °F (36.9 °C)] 98.1 °F (36.7 °C)  Pulse:  [73-88] 78  Resp:  [15-18] 16  SpO2:  [95 %-98 %] 97 %  BP: (119-141)/(55-74) 123/55     Weight: 96 kg (211 lb 10.3 oz)  Body mass index is 37.49 kg/m².  Patient's last menstrual period was 05/25/2019.    I&O (Last 24H):    Intake/Output Summary (Last 24 hours) at 6/2/2019 0528  Last data filed at 6/2/2019 0200  Gross per 24 hour   Intake 2170 ml   Output 400 ml   Net 1770 ml       Physical Exam:   Constitutional: She is oriented to person, place, and time. She appears well-developed and well-nourished. No distress.    HENT:   Head: Normocephalic and atraumatic.    Eyes: EOM are normal.    Neck: Normal range of motion.    Cardiovascular: Normal rate, regular rhythm, normal heart sounds and intact distal pulses.     Pulmonary/Chest: Effort normal and breath sounds normal.        Abdominal: Soft. She exhibits no distension. There is  tenderness (bialteral lower quadrants). There is no rebound and no guarding.             Musculoskeletal: Normal range of motion. She exhibits no edema or tenderness.       Neurological: She is alert and oriented to person, place, and time.    Skin: Skin is warm and dry. She is not diaphoretic.    Psychiatric: She has a normal mood and affect.       Laboratory:  CBC:   Recent Labs   Lab 06/01/19  1113   WBC 15.56*   RBC 4.18   HGB 11.7*   HCT 36.3*   *   MCV 87   MCH 28.0   MCHC 32.2     CMP:   Recent Labs   Lab 06/01/19  1113      CALCIUM 10.3   ALBUMIN 3.4*   PROT 7.5      K 4.0   CO2 21*      BUN 11   CREATININE 0.6   ALKPHOS 91   ALT 14   AST 20   BILITOT 0.9     Urine Pregnancy Test: No results for input(s): PREGTESTUR in the last 48 hours.    Diagnostic Results:  TVUS   FINDINGS:  Uterus:    Size: 7.6 x 4.2 x 5.8 cm    Masses: No masses within the uterine parenchyma.    Endometrium: Endometrium is normal in caliber measuring 5 mm.  6 x 4 mm hyperechoic focus within the endometrium near the fundus, potentially representing a polyp.    Complex fluid collections within both adnexal regions measuring 8.5 x 6.5 x 6.2 cm on the right and 4.1 x 2.1 x 3.5 cm on the left.  The ovarian tissue is difficult to delineate from the complex fluid collections however, the adnexa do not appear enlarged.  There is normal arterial and venous flow to the adnexal regions without evidence of torsion.    Free Fluid:    Complex free fluid throughout the pelvis separate from these adnexal fluid collections.      Impression       Bilateral large complex adnexal fluid collections and complex free fluid throughout the pelvis.  Findings are concerning for tubo-ovarian abscesses versus hemorrhagic cysts.  Clinical correlation and further evaluation as warranted.    Probable endometrial polyp.

## 2019-06-02 NOTE — ED NOTES
Pt arrived in stable condition. Pt states she is in pain and is nauseous. Provider at bedside for immediate evaluation.

## 2019-06-02 NOTE — HOSPITAL COURSE
06/01/2019 Admitted for management of TOA. IV cefoxitin and doxy started. GC/CT collected. IR consulted for drain placement.  06/02/2019 NAEON. Pt feeling alright this morning. Pain better controlled. Has not had any further emesis since admission. Has not had an appetite. Urinating and ambulating without difficulty  06/03/2019 Pt reports mild tenderness, pain has almost completely resolved. Denies vomiting. Reports mild nausea that she believes is associated with narcotics. Urinating and ambulating without difficulty. Pt stable for discharge. Will discharge home with PO doxy x14 days with close follow up.

## 2019-06-02 NOTE — HPI
Sheryl Klein is a 38 y.o.  who presents as a transfer from [facility]. Pt initially presented with abdominal pain and intermittent suprapubic pain to an [facility] where underwent she underwent imaging revealing a complex ovarian cyst on the right and a ruptured hemorrhagic cyst on the left. Pain was controlled with PO pain medication and the patient was discharged to follow up with her GYN. Patient then had an episode of hematuria this AM which prompted her to present to [facility]. TVUS showed evidence of bilateral complex fluid collections with presence of free fluid. Pt also had elevated white count and was transferred to [facility] for concern of bilateral TOAs     On presentation, pt is complaining of continued abdominal pain, nausea/vomiting. Endorses hematuria and dysuria. Denies fever/chills.      Gyn history  Follows with GYN in [facility], normal pap within the last 6 months  Denies history of abnormal paps or excision procedures  Denies history of STDs     Ob history  G1: elective   G2:  at term  G3:  at term

## 2019-06-02 NOTE — PROCEDURES
Radiology Post-Procedure Note    Pre Op Diagnosis: Right adnexal complex fluid collection  Post Op Diagnosis: Same    Procedure: CT-guided aspiration    Procedure performed by: Darius Agee MD    Written Informed Consent Obtained: Yes  Specimen Removed: YES, 0.5-cc of dark blood and clot fragments  Estimated Blood Loss: none    Findings:   Successful aspiration of right adnexal complex fluid collection. Only able to aspirate scant dark blood and tiny clot fragments despite superficial and deep sampling suggesting hematoma therefore, drainage catheter not placed. Patient tolerated the procedure well. No immediate post-procedural complications noted.       Darius Agee MD  Interventional Radiology

## 2019-06-02 NOTE — NURSING
"Pt arrived to unit via stretcher. Ambulated to bed with assistance. Pt denies pain saying, "that medicine made me real tired". POC reviewed with patient and family. Verbalized understanding.  "

## 2019-06-02 NOTE — PROGRESS NOTES
Pharmacokinetic Initial Assessment: Gentamicin    Assessment:  Weight utilized for dose calculation: Adjusted Body Weight  Dosing method utilized: extended interval dosing    Plan: Extended interval dosing regimen: Gentamicin 349.2 mg IV once, followed by a random level to be drawn on 6/3 at 0200, 8-12 hours after the first dose.    Pharmacy will continue to monitor.    Please contact pharmacy at extension [phone] with any questions regarding this assessment.    Thank you for the consult,   Washington Ortega     Patient brief summary:  Sheryl Klein is a 38 y.o. female initiated on antimicrobial therapy with IV Gentamicin for treatment of suspected skin & soft tissue    Of note: for dosing recommendations in consults related to pulmonary exacerbations due to Cystic Fibrosis, please contact Infectious Disease (ID) or contact ID pharmacy.    Drug Allergies:   Review of patient's allergies indicates:   Allergen Reactions    Cefoxitin Hives    Penicillins Rash       Actual Body Weight:   96 kg    Adjust Body Weight:   69.8 kg    Ideal Body Weight:  52.4 kg    Renal Function:   Estimated Creatinine Clearance: 140.1 mL/min (based on SCr of 0.6 mg/dL).,     Dialysis Method (if applicable):  None    CBC (last 72 hours):  Recent Labs   Lab Result Units 06/01/19  1113 06/02/19  0408   WBC K/uL 15.56* 7.48   Hemoglobin g/dL 11.7* 9.9*   Hematocrit % 36.3* 30.5*   Platelets K/uL 351* 276   Gran% % 90.5* 75.0*   Lymph% % 5.2* 17.0*   Mono% % 3.1* 6.0   Eosinophil% % 0.1 0.0   Basophil% % 0.3 1.0   Differential Method  Automated Manual       Metabolic Panel (last 72 hours):  Recent Labs   Lab Result Units 06/01/19  1109 06/01/19  1113   Sodium mmol/L  --  138   Potassium mmol/L  --  4.0   Chloride mmol/L  --  105   CO2 mmol/L  --  21*   Glucose mg/dL  --  103   Glucose, UA  Negative  --    BUN, Bld mg/dL  --  11   Creatinine mg/dL  --  0.6   Albumin g/dL  --  3.4*   Total Bilirubin mg/dL  --  0.9   Alkaline Phosphatase U/L  --  91    AST U/L  --  20   ALT U/L  --  14       Microbiologic Results:  Microbiology Results (last 7 days)     Procedure Component Value Units Date/Time    Blood culture #2 [[id number]] Collected:  06/01/19 1731    Order Status:  Completed Specimen:  Blood from Peripheral, Hand, Right Updated:  06/02/19 0145     Blood Culture, Routine No Growth to date    Narrative:       Blood Culture #2    Blood culture #1 [[id number]] Collected:  06/01/19 1731    Order Status:  Completed Specimen:  Blood from Peripheral, Wrist, Right Updated:  06/02/19 0145     Blood Culture, Routine No Growth to date    Narrative:       Blood Culture #1    C. trachomatis/N. gonorrhoeae by AMP DNA [facility]; Cervix [[id number]] Collected:  06/01/19 1855    Order Status:  Sent Specimen:  Genital Updated:  06/01/19 2044

## 2019-06-03 VITALS
SYSTOLIC BLOOD PRESSURE: 116 MMHG | DIASTOLIC BLOOD PRESSURE: 57 MMHG | WEIGHT: 211.63 LBS | OXYGEN SATURATION: 99 % | RESPIRATION RATE: 19 BRPM | HEART RATE: 82 BPM | BODY MASS INDEX: 37.5 KG/M2 | HEIGHT: 63 IN | TEMPERATURE: 97 F

## 2019-06-03 LAB
BASOPHILS # BLD AUTO: 0.01 K/UL (ref 0–0.2)
BASOPHILS NFR BLD: 0.2 % (ref 0–1.9)
C TRACH DNA SPEC QL NAA+PROBE: NOT DETECTED
CREAT SERPL-MCNC: 0.6 MG/DL (ref 0.5–1.4)
DIFFERENTIAL METHOD: ABNORMAL
EOSINOPHIL # BLD AUTO: 0.2 K/UL (ref 0–0.5)
EOSINOPHIL NFR BLD: 3.4 % (ref 0–8)
ERYTHROCYTE [DISTWIDTH] IN BLOOD BY AUTOMATED COUNT: 12.7 % (ref 11.5–14.5)
EST. GFR  (AFRICAN AMERICAN): >60 ML/MIN/1.73 M^2
EST. GFR  (NON AFRICAN AMERICAN): >60 ML/MIN/1.73 M^2
GENTAMICIN SERPL-MCNC: 3.1 UG/ML
GENTAMICIN TROUGH SERPL-MCNC: <0.5 UG/ML (ref 0–2)
HCT VFR BLD AUTO: 33.8 % (ref 37–48.5)
HGB BLD-MCNC: 11.1 G/DL (ref 12–16)
LYMPHOCYTES # BLD AUTO: 1.3 K/UL (ref 1–4.8)
LYMPHOCYTES NFR BLD: 24 % (ref 18–48)
MCH RBC QN AUTO: 27.8 PG (ref 27–31)
MCHC RBC AUTO-ENTMCNC: 32.8 G/DL (ref 32–36)
MCV RBC AUTO: 85 FL (ref 82–98)
MONOCYTES # BLD AUTO: 0.5 K/UL (ref 0.3–1)
MONOCYTES NFR BLD: 9.3 % (ref 4–15)
N GONORRHOEA DNA SPEC QL NAA+PROBE: NOT DETECTED
NEUTROPHILS # BLD AUTO: 3.3 K/UL (ref 1.8–7.7)
NEUTROPHILS NFR BLD: 62.5 % (ref 38–73)
PLATELET # BLD AUTO: 302 K/UL (ref 150–350)
PMV BLD AUTO: 8 FL (ref 9.2–12.9)
RBC # BLD AUTO: 3.99 M/UL (ref 4–5.4)
WBC # BLD AUTO: 5.29 K/UL (ref 3.9–12.7)

## 2019-06-03 PROCEDURE — 63600175 PHARM REV CODE 636 W HCPCS

## 2019-06-03 PROCEDURE — 80170 ASSAY OF GENTAMICIN: CPT | Mod: 91

## 2019-06-03 PROCEDURE — 36415 COLL VENOUS BLD VENIPUNCTURE: CPT

## 2019-06-03 PROCEDURE — 99232 SBSQ HOSP IP/OBS MODERATE 35: CPT | Mod: ,,, | Performed by: OBSTETRICS & GYNECOLOGY

## 2019-06-03 PROCEDURE — 25000003 PHARM REV CODE 250: Performed by: STUDENT IN AN ORGANIZED HEALTH CARE EDUCATION/TRAINING PROGRAM

## 2019-06-03 PROCEDURE — S0028 INJECTION, FAMOTIDINE, 20 MG: HCPCS | Performed by: STUDENT IN AN ORGANIZED HEALTH CARE EDUCATION/TRAINING PROGRAM

## 2019-06-03 PROCEDURE — 99232 PR SUBSEQUENT HOSPITAL CARE,LEVL II: ICD-10-PCS | Mod: ,,, | Performed by: OBSTETRICS & GYNECOLOGY

## 2019-06-03 PROCEDURE — 80170 ASSAY OF GENTAMICIN: CPT

## 2019-06-03 PROCEDURE — 82565 ASSAY OF CREATININE: CPT

## 2019-06-03 PROCEDURE — 85025 COMPLETE CBC W/AUTO DIFF WBC: CPT

## 2019-06-03 PROCEDURE — S0077 INJECTION, CLINDAMYCIN PHOSP: HCPCS | Performed by: STUDENT IN AN ORGANIZED HEALTH CARE EDUCATION/TRAINING PROGRAM

## 2019-06-03 PROCEDURE — 25000003 PHARM REV CODE 250

## 2019-06-03 RX ORDER — ONDANSETRON 8 MG/1
8 TABLET, ORALLY DISINTEGRATING ORAL EVERY 8 HOURS PRN
Qty: 15 TABLET | Refills: 1 | Status: SHIPPED | OUTPATIENT
Start: 2019-06-03 | End: 2019-06-25

## 2019-06-03 RX ORDER — ACETAMINOPHEN 325 MG/1
650 TABLET ORAL EVERY 8 HOURS PRN
Refills: 0 | COMMUNITY
Start: 2019-06-03 | End: 2019-10-03

## 2019-06-03 RX ORDER — DOXYCYCLINE HYCLATE 100 MG
100 TABLET ORAL 2 TIMES DAILY
Qty: 28 TABLET | Refills: 0 | Status: SHIPPED | OUTPATIENT
Start: 2019-06-03 | End: 2019-06-17

## 2019-06-03 RX ORDER — HYDROCODONE BITARTRATE AND ACETAMINOPHEN 5; 325 MG/1; MG/1
1 TABLET ORAL EVERY 6 HOURS PRN
Qty: 10 TABLET | Refills: 0 | Status: SHIPPED | OUTPATIENT
Start: 2019-06-03 | End: 2019-08-06

## 2019-06-03 RX ADMIN — CLINDAMYCIN IN 5 PERCENT DEXTROSE 900 MG: 18 INJECTION, SOLUTION INTRAVENOUS at 12:06

## 2019-06-03 RX ADMIN — CLINDAMYCIN IN 5 PERCENT DEXTROSE 900 MG: 18 INJECTION, SOLUTION INTRAVENOUS at 05:06

## 2019-06-03 RX ADMIN — CLINDAMYCIN IN 5 PERCENT DEXTROSE 900 MG: 18 INJECTION, SOLUTION INTRAVENOUS at 08:06

## 2019-06-03 RX ADMIN — FAMOTIDINE 20 MG: 10 INJECTION, SOLUTION INTRAVENOUS at 08:06

## 2019-06-03 RX ADMIN — HYDROCODONE BITARTRATE AND ACETAMINOPHEN 1 TABLET: 5; 325 TABLET ORAL at 02:06

## 2019-06-03 RX ADMIN — ONDANSETRON 8 MG: 8 TABLET, ORALLY DISINTEGRATING ORAL at 08:06

## 2019-06-03 RX ADMIN — ACETAMINOPHEN 650 MG: 325 TABLET, FILM COATED ORAL at 01:06

## 2019-06-03 RX ADMIN — GENTAMICIN SULFATE 349.2 MG: 40 INJECTION, SOLUTION INTRAMUSCULAR; INTRAVENOUS at 06:06

## 2019-06-03 NOTE — ASSESSMENT & PLAN NOTE
TVUS with bilateral complex fluid collections; 8.5x6.5x6.2 on the right and 4.1x2.1x3.5 on the left  - WBC 15.56>7.48>5.29  - IR drainage returned only 0.5cc dark clot and clot fragments  - cefoxitin/doxy switched to gent/clinda secondary to reaction to cefoxitin, continue for 48 hours (first dose 1730 on 6/1)  - pain much improved since initial presentation  - pt remains afebrile  - zofran/phenergan PRN  - tylenol scheduled  - norco 5/10 PRN for pain  - GC/CT pending

## 2019-06-03 NOTE — PLAN OF CARE
Problem: Adult Inpatient Plan of Care  Goal: Plan of Care Review  Outcome: Ongoing (interventions implemented as appropriate)  Plan of care reviewed with patient.  VSS.  IV antibiotics administered per orders.  Patient will be discharged after this evenings doses.  Discharge instructions reviewed with patient.  Patient denies questions at this time.  Purposeful rounding completed, call bell within reach, no needs at this time.

## 2019-06-03 NOTE — PROGRESS NOTES
[facility]  Obstetrics & Gynecology  Progress Note    Patient Name: Sheryl Klein  MRN: [id number]  Admission Date: 2019  Primary Care Provider: Primary Doctor No  Principal Problem: Tubo-ovarian abscess    Subjective:     HPI:  Sheryl Klein is a 38 y.o.  who presents as a transfer from [facility]. Pt initially presented with abdominal pain and intermittent suprapubic pain to an [facility] where underwent she underwent imaging revealing a complex ovarian cyst on the right and a ruptured hemorrhagic cyst on the left. Pain was controlled with PO pain medication and the patient was discharged to follow up with her GYN. Patient then had an episode of hematuria this AM which prompted her to present to [facility]. TVUS showed evidence of bilateral complex fluid collections with presence of free fluid. Pt also had elevated white count and was transferred to [facility] for concern of bilateral TOAs     On presentation, pt is complaining of continued abdominal pain, nausea/vomiting. Endorses hematuria and dysuria. Denies fever/chills.      Gyn history  Follows with GYN in [facility], normal pap within the last 6 months  Denies history of abnormal paps or excision procedures  Denies history of STDs     Ob history  G1: elective   G2:  at term  G3:  at term    Interval History:   Pt feeling much better this morning. Reports mild tenderness, pain almost completely resolved. Reports mild nausea associated with narcotics. Denies vomiting.    Scheduled Meds:   clindamycin (CLEOCIN) IVPB  900 mg Intravenous Q8H    famotidine (PF)  20 mg Intravenous BID    gentamicin  5 mg/kg (Adjusted) Intravenous Q24H     Continuous Infusions:    PRN Meds:acetaminophen, calcium carbonate, HYDROcodone-acetaminophen, HYDROcodone-acetaminophen, ondansetron, promethazine (PHENERGAN) IVPB, sodium chloride 0.9%    Review of patient's allergies indicates:   Allergen Reactions    Cefoxitin Hives     Penicillins Rash       Objective:     Vital Signs (Most Recent):  Temp: 97.4 °F (36.3 °C) (06/03/19 0537)  Pulse: (!) 57 (06/03/19 0537)  Resp: 18 (06/03/19 0537)  BP: (!) 107/55 (06/03/19 0537)  SpO2: 97 % (06/03/19 0537) Vital Signs (24h Range):  Temp:  [97.4 °F (36.3 °C)-98.2 °F (36.8 °C)] 97.4 °F (36.3 °C)  Pulse:  [57-98] 57  Resp:  [14-19] 18  SpO2:  [97 %-100 %] 97 %  BP: (107-150)/(55-86) 107/55     Weight: 96 kg (211 lb 10.3 oz)  Body mass index is 37.49 kg/m².  Patient's last menstrual period was 05/25/2019.    I&O (Last 24H):    Intake/Output Summary (Last 24 hours) at 6/3/2019 0644  Last data filed at 6/3/2019 0050  Gross per 24 hour   Intake 1080 ml   Output 650 ml   Net 430 ml       Physical Exam:   Constitutional: She is oriented to person, place, and time. She appears well-developed and well-nourished. No distress.    HENT:   Head: Normocephalic and atraumatic.    Eyes: EOM are normal.    Neck: Normal range of motion.    Cardiovascular: Normal rate, regular rhythm, normal heart sounds and intact distal pulses.     Pulmonary/Chest: Effort normal and breath sounds normal.        Abdominal: Soft. She exhibits no distension. There is tenderness (bialteral lower quadrants). There is no rebound and no guarding.             Musculoskeletal: Normal range of motion. She exhibits no edema or tenderness.       Neurological: She is alert and oriented to person, place, and time.    Skin: Skin is warm and dry. She is not diaphoretic.    Psychiatric: She has a normal mood and affect.       Laboratory:  CBC:   Recent Labs   Lab 06/03/19  0505   WBC 5.29   RBC 3.99*   HGB 11.1*   HCT 33.8*      MCV 85   MCH 27.8   MCHC 32.8     CMP:   Recent Labs   Lab 06/01/19  1113      CALCIUM 10.3   ALBUMIN 3.4*   PROT 7.5      K 4.0   CO2 21*      BUN 11   CREATININE 0.6   ALKPHOS 91   ALT 14   AST 20   BILITOT 0.9     Urine Pregnancy Test: No results for input(s): PREGTESTUR in the last 48  hours.    Diagnostic Results:  TVUS   FINDINGS:  Uterus:    Size: 7.6 x 4.2 x 5.8 cm    Masses: No masses within the uterine parenchyma.    Endometrium: Endometrium is normal in caliber measuring 5 mm.  6 x 4 mm hyperechoic focus within the endometrium near the fundus, potentially representing a polyp.    Complex fluid collections within both adnexal regions measuring 8.5 x 6.5 x 6.2 cm on the right and 4.1 x 2.1 x 3.5 cm on the left.  The ovarian tissue is difficult to delineate from the complex fluid collections however, the adnexa do not appear enlarged.  There is normal arterial and venous flow to the adnexal regions without evidence of torsion.    Free Fluid:    Complex free fluid throughout the pelvis separate from these adnexal fluid collections.      Impression       Bilateral large complex adnexal fluid collections and complex free fluid throughout the pelvis.  Findings are concerning for tubo-ovarian abscesses versus hemorrhagic cysts.  Clinical correlation and further evaluation as warranted.    Probable endometrial polyp.       OB History   No data available     History reviewed. No pertinent past medical history.  Past Surgical History:   Procedure Laterality Date    SLEEVE GASTROPLASTY      TONSILLECTOMY         PTA Medications   Medication Sig    ondansetron (ZOFRAN-ODT) 4 MG TbDL Take 8 mg by mouth every 12 (twelve) hours.    oxyCODONE-acetaminophen (PERCOCET) 2.5-325 mg per tablet Take 1 tablet by mouth every 4 (four) hours as needed for Pain.       Review of patient's allergies indicates:   Allergen Reactions    Cefoxitin Hives    Penicillins Rash        Family History     Problem Relation (Age of Onset)    Diabetes Father        Tobacco Use    Smoking status: Never Smoker   Substance and Sexual Activity    Alcohol use: Not on file    Drug use: Not on file    Sexual activity: Not on file     Review of Systems   Constitutional: Negative for chills and fever.   Eyes: Negative for visual  disturbance.   Respiratory: Negative for shortness of breath.    Cardiovascular: Negative for chest pain and palpitations.   Gastrointestinal: Negative for abdominal pain, constipation, diarrhea, nausea and vomiting.   Genitourinary: Negative for vaginal bleeding and vaginal discharge.   Musculoskeletal: Negative for back pain.   Integumentary:  Negative for rash.   Neurological: Negative for seizures, syncope and headaches.   Hematological: Does not bruise/bleed easily.   Psychiatric/Behavioral: Negative for depression. The patient is not nervous/anxious.       Objective:     Vital Signs (Most Recent):  Temp: 97.4 °F (36.3 °C) (06/03/19 0537)  Pulse: (!) 57 (06/03/19 0537)  Resp: 18 (06/03/19 0537)  BP: (!) 107/55 (06/03/19 0537)  SpO2: 97 % (06/03/19 0537) Vital Signs (24h Range):  Temp:  [97.4 °F (36.3 °C)-98.2 °F (36.8 °C)] 97.4 °F (36.3 °C)  Pulse:  [57-98] 57  Resp:  [14-19] 18  SpO2:  [97 %-100 %] 97 %  BP: (107-150)/(55-86) 107/55     Weight: 96 kg (211 lb 10.3 oz)  Body mass index is 37.49 kg/m².    Patient's last menstrual period was 05/25/2019.    Physical Exam:   Constitutional: She is oriented to person, place, and time. She appears well-developed and well-nourished. No distress.    HENT:   Head: Normocephalic and atraumatic.   Nose: No epistaxis.    Eyes: Conjunctivae and EOM are normal.    Neck: Normal range of motion. Neck supple. No thyromegaly present.    Cardiovascular: Normal rate and regular rhythm.     Pulmonary/Chest: Effort normal. No respiratory distress.        Abdominal: Soft. She exhibits no distension. There is tenderness (mild lower abdominal tenderness).             Musculoskeletal: Normal range of motion and moves all extremeties. She exhibits no edema or tenderness.       Neurological: She is alert and oriented to person, place, and time.    Skin: Skin is warm and dry. No rash noted.    Psychiatric: She has a normal mood and affect. Her behavior is normal.       Laboratory:  CBC:    Recent Labs   Lab 06/03/19  0505   WBC 5.29   RBC 3.99*   HGB 11.1*   HCT 33.8*      MCV 85   MCH 27.8   MCHC 32.8       Diagnostic Results:  Labs: Reviewed    Assessment/Plan:     Tubo-ovarian abscess  TVUS with bilateral complex fluid collections; 8.5x6.5x6.2 on the right and 4.1x2.1x3.5 on the left  - WBC 15.56>7.48>5.29  - IR drainage returned only 0.5cc dark clot and clot fragments  - cefoxitin/doxy switched to gent/clinda secondary to reaction to cefoxitin, continue for 48 hours (first dose 1730 on 6/1)  - pain much improved since initial presentation  - pt remains afebrile  - zofran/phenergan PRN  - tylenol scheduled  - norco 5/10 PRN for pain  - GC/CT pending        Marti Ritchie MD  Obstetrics & Gynecology  [facility]    SYMPTOMATICALLY MUCH IMPROVED - WILL GIVE LAST IV ABX DOSES THIS AFTERNOON AND PLAN ON DISCHARGE HOME WITH PO ABX, TO F/U [facility] IN 2-3 WEEKS.  DISCUSSED ACTIVITY, WARNING SIGNS.  HAS TOLERATED PO ASPIRIN AND TYLENOL IN THE WEEKS PRIOR TO ADMISSION, SO OTC NSAIDS (DOSED SPARINGLY) AND TYLENOL PRN

## 2019-06-03 NOTE — PROGRESS NOTES
Pharmacokinetic Follow Up: Gentamicin    Assessment of Gentamicin levels:   Random concentration of 3.3 mcg/mL (8 hours post-infusion) corresponds to a dosing interval of every 24 hours per the Georgette Nomogram    Gentamicin Regimen Plan:   Will check trough concentration 30 min prior to second dose on 6/3/19 at 1730     Pharmacy will continue to monitor.    Please contact pharmacy at extension [phone] with any questions regarding this assessment.    Thank you for the consult,   Yoni King    Patient brief summary:  Sheryl Klein is a 38 y.o. female initiated on antimicrobial therapy with IV Gentamicin for treatment of suspected skin & soft tissue    Of note: for dosing recommendations in consults related to pulmonary exacerbations due to Cystic Fibrosis, please contact Infectious Disease (ID) or contact ID pharmacy.    Drug Allergies:   Review of patient's allergies indicates:   Allergen Reactions    Cefoxitin Hives    Penicillins Rash       Actual Body Weight:   96kg    Adjust Body Weight:   69.8kg    Ideal Body Weight:  52.4kg    Renal Function:   Estimated Creatinine Clearance: 140.1 mL/min (based on SCr of 0.6 mg/dL).,     Dialysis Method (if applicable):  N/A     CBC (last 72 hours):  Recent Labs   Lab Result Units 06/01/19  1113 06/02/19  0408   WBC K/uL 15.56* 7.48   Hemoglobin g/dL 11.7* 9.9*   Hematocrit % 36.3* 30.5*   Platelets K/uL 351* 276   Gran% % 90.5* 75.0*   Lymph% % 5.2* 17.0*   Mono% % 3.1* 6.0   Eosinophil% % 0.1 0.0   Basophil% % 0.3 1.0   Differential Method  Automated Manual       Metabolic Panel (last 72 hours):  Recent Labs   Lab Result Units 06/01/19  1109 06/01/19  1113   Sodium mmol/L  --  138   Potassium mmol/L  --  4.0   Chloride mmol/L  --  105   CO2 mmol/L  --  21*   Glucose mg/dL  --  103   Glucose, UA  Negative  --    BUN, Bld mg/dL  --  11   Creatinine mg/dL  --  0.6   Albumin g/dL  --  3.4*   Total Bilirubin mg/dL  --  0.9   Alkaline Phosphatase U/L  --  91   AST U/L  --   20   ALT U/L  --  14       Aminoglycoside Administrations:  aminoglycosides given in last 96 hours                     gentamicin (GARAMYCIN) 349.2 mg in sodium chloride 0.9% 100 mL IVPB (mg) 349.2 mg New Bag 06/02/19 1745                      Drug levels (last 3 results):  No results for input(s): AMIKACINPEAK, AMIKACINTROU, AMIKACINRAND, AMIKACIN in the last 72 hours.    No results for input(s): GENTAMICIN, GENTPEAK, GENTTROUGH, GENT10, GENT12, GENT8, GENTRANDOM in the last 72 hours.    No results for input(s): TOBRA8, TOBRA10, TOBRA12, TOBRARND, TOBRAMYCIN, TOBRAPEAK, TOBRATROUGH, TOBRAMYCINPE, TOBRAMYCINRA, TOBRAMYCINTR in the last 72 hours.    Microbiologic Results:  Microbiology Results (last 7 days)       Procedure Component Value Units Date/Time    Blood culture #1 [[id number]] Collected:  06/01/19 1731    Order Status:  Completed Specimen:  Blood from Peripheral, Wrist, Right Updated:  06/02/19 2012     Blood Culture, Routine No Growth to date     Blood Culture, Routine No Growth to date    Narrative:       Blood Culture #1    Blood culture #2 [[id number]] Collected:  06/01/19 1731    Order Status:  Completed Specimen:  Blood from Peripheral, Hand, Right Updated:  06/02/19 2012     Blood Culture, Routine No Growth to date     Blood Culture, Routine No Growth to date    Narrative:       Blood Culture #2    C. trachomatis/N. gonorrhoeae by AMP DNA [facility]; Cervix [[id number]] Collected:  06/01/19 1855    Order Status:  Sent Specimen:  Genital Updated:  06/01/19 2044

## 2019-06-03 NOTE — PLAN OF CARE
Patient resting in NAD. VSS on RA. PRN medication given for abdominal discomfort and HA. Dressing to R abdomen c/d/i. No needs expressed at this time. Safety measures maintained. Will continue to monitor.

## 2019-06-03 NOTE — DISCHARGE SUMMARY
[facility]  Obstetrics & Gynecology  Discharge Summary    Patient Name: Sheryl Klein  MRN: [id number]  Admission Date: 2019  Hospital Length of Stay: 2 days  Discharge Date and Time:  2019 5:22 PM  Attending Physician: Sissy Chaparro MD   Discharging Provider: Marti Ritchie MD  Primary Care Provider: Primary Doctor No    HPI:  Sheryl Klein is a 38 y.o.  who presents as a transfer from [facility]. Pt initially presented with abdominal pain and intermittent suprapubic pain to an [facility] where underwent she underwent imaging revealing a complex ovarian cyst on the right and a ruptured hemorrhagic cyst on the left. Pain was controlled with PO pain medication and the patient was discharged to follow up with her GYN. Patient then had an episode of hematuria this AM which prompted her to present to [facility]. TVUS showed evidence of bilateral complex fluid collections with presence of free fluid. Pt also had elevated white count and was transferred to [facility] for concern of bilateral TOAs     On presentation, pt is complaining of continued abdominal pain, nausea/vomiting. Endorses hematuria and dysuria. Denies fever/chills.      Gyn history  Follows with GYN in [facility], normal pap within the last 6 months  Denies history of abnormal paps or excision procedures  Denies history of STDs     Ob history  G1: elective   G2:  at term  G3:  at term    Hospital Course:  2019 Admitted for management of TOA. IV cefoxitin and doxy started. GC/CT collected. IR consulted for drain placement.  2019 NAEON. Pt feeling alright this morning. Pain better controlled. Has not had any further emesis since admission. Has not had an appetite. Urinating and ambulating without difficulty  2019 Pt reports mild tenderness, pain has almost completely resolved. Denies vomiting. Reports mild nausea that she believes is associated with narcotics. Urinating  and ambulating without difficulty. Pt stable for discharge. Will discharge home with PO doxy x14 days with close follow up.        Procedure(s) (LRB):  BIOPSY, WITH CT GUIDANCE (N/A)     Consults (From admission, onward)        Status Ordering Provider     Inpatient consult to Interventional Radiology  Once     Provider:  (Not yet assigned)    Completed [address]     PHARMACY TO DOSE aminoglycosides consult  Once     Provider:  (Not yet assigned)    Acknowledged NIEVES RUSHING          Significant Diagnostic Studies: Labs: All labs within the past 24 hours have been reviewed    Pending Diagnostic Studies:     Procedure Component Value Units Date/Time    Creatinine, serum [[id number]]     Order Status:  Sent Lab Status:  No result     Specimen:  Blood     GENTAMICIN, TROUGH before 2nd dose [[id number]] Collected:  06/03/19 1730    Order Status:  Sent Lab Status:  No result     Specimen:  Blood         Final Active Diagnoses:    Diagnosis Date Noted POA    PRINCIPAL PROBLEM:  Tubo-ovarian abscess [N70.93] 06/01/2019 Yes      Problems Resolved During this Admission:        Discharged Condition: good    Disposition: Home or Self Care    Follow Up:  Follow-up Information     [place] - [facility]/ [facility] In 3 weeks.    Specialty:  Obstetrics and Gynecology  Why:  follow up  Contact information:  [address] [place] [id number] [phone]               Patient Instructions:      Notify your health care provider if you experience any of the following:     Notify your health care provider if you experience any of the following:  increased confusion or weakness     Notify your health care provider if you experience any of the following:  persistent dizziness, light-headedness, or visual disturbances     Notify your health care provider if you experience any of the following:  difficulty breathing or increased cough     Notify your health care provider if you experience any of the following:   severe persistent headache     Notify your health care provider if you experience any of the following:  severe uncontrolled pain     Notify your health care provider if you experience any of the following:  persistent nausea and vomiting or diarrhea     Notify your health care provider if you experience any of the following:  temperature >100.4     Activity as tolerated     Medications:  Reconciled Home Medications:      Medication List      START taking these medications    acetaminophen 325 MG tablet  Commonly known as:  TYLENOL  Take 2 tablets (650 mg total) by mouth every 8 (eight) hours as needed.     doxycycline 100 MG tablet  Commonly known as:  VIBRA-TABS  Take 1 tablet (100 mg total) by mouth 2 (two) times daily. for 14 days     HYDROcodone-acetaminophen 5-325 mg per tablet  Commonly known as:  NORCO  Take 1 tablet by mouth every 6 (six) hours as needed.        CHANGE how you take these medications    ondansetron 8 MG Tbdl  Commonly known as:  ZOFRAN-ODT  Take 1 tablet (8 mg total) by mouth every 8 (eight) hours as needed.  What changed:    · medication strength  · when to take this  · reasons to take this        STOP taking these medications    clindamycin 75 MG capsule  Commonly known as:  CLEOCIN     oxyCODONE-acetaminophen 2.5-325 mg per tablet  Commonly known as:  PERCOCET     predniSONE 5 MG tablet  Commonly known as:  DELTASONE            Marti Ritchie MD  Obstetrics & Gynecology  [facility]

## 2019-06-03 NOTE — SUBJECTIVE & OBJECTIVE
Interval History:   Pt feeling much better this morning. Reports mild tenderness, pain almost completely resolved. Reports mild nausea associated with narcotics. Denies vomiting.    Scheduled Meds:   clindamycin (CLEOCIN) IVPB  900 mg Intravenous Q8H    famotidine (PF)  20 mg Intravenous BID    gentamicin  5 mg/kg (Adjusted) Intravenous Q24H     Continuous Infusions:    PRN Meds:acetaminophen, calcium carbonate, HYDROcodone-acetaminophen, HYDROcodone-acetaminophen, ondansetron, promethazine (PHENERGAN) IVPB, sodium chloride 0.9%    Review of patient's allergies indicates:   Allergen Reactions    Cefoxitin Hives    Penicillins Rash       Objective:     Vital Signs (Most Recent):  Temp: 97.4 °F (36.3 °C) (06/03/19 0537)  Pulse: (!) 57 (06/03/19 0537)  Resp: 18 (06/03/19 0537)  BP: (!) 107/55 (06/03/19 0537)  SpO2: 97 % (06/03/19 0537) Vital Signs (24h Range):  Temp:  [97.4 °F (36.3 °C)-98.2 °F (36.8 °C)] 97.4 °F (36.3 °C)  Pulse:  [57-98] 57  Resp:  [14-19] 18  SpO2:  [97 %-100 %] 97 %  BP: (107-150)/(55-86) 107/55     Weight: 96 kg (211 lb 10.3 oz)  Body mass index is 37.49 kg/m².  Patient's last menstrual period was 05/25/2019.    I&O (Last 24H):    Intake/Output Summary (Last 24 hours) at 6/3/2019 0644  Last data filed at 6/3/2019 0050  Gross per 24 hour   Intake 1080 ml   Output 650 ml   Net 430 ml       Physical Exam:   Constitutional: She is oriented to person, place, and time. She appears well-developed and well-nourished. No distress.    HENT:   Head: Normocephalic and atraumatic.    Eyes: EOM are normal.    Neck: Normal range of motion.    Cardiovascular: Normal rate, regular rhythm, normal heart sounds and intact distal pulses.     Pulmonary/Chest: Effort normal and breath sounds normal.        Abdominal: Soft. She exhibits no distension. There is tenderness (bialteral lower quadrants). There is no rebound and no guarding.             Musculoskeletal: Normal range of motion. She exhibits no edema or  tenderness.       Neurological: She is alert and oriented to person, place, and time.    Skin: Skin is warm and dry. She is not diaphoretic.    Psychiatric: She has a normal mood and affect.       Laboratory:  CBC:   Recent Labs   Lab 06/03/19  0505   WBC 5.29   RBC 3.99*   HGB 11.1*   HCT 33.8*      MCV 85   MCH 27.8   MCHC 32.8     CMP:   Recent Labs   Lab 06/01/19  1113      CALCIUM 10.3   ALBUMIN 3.4*   PROT 7.5      K 4.0   CO2 21*      BUN 11   CREATININE 0.6   ALKPHOS 91   ALT 14   AST 20   BILITOT 0.9     Urine Pregnancy Test: No results for input(s): PREGTESTUR in the last 48 hours.    Diagnostic Results:  TVUS   FINDINGS:  Uterus:    Size: 7.6 x 4.2 x 5.8 cm    Masses: No masses within the uterine parenchyma.    Endometrium: Endometrium is normal in caliber measuring 5 mm.  6 x 4 mm hyperechoic focus within the endometrium near the fundus, potentially representing a polyp.    Complex fluid collections within both adnexal regions measuring 8.5 x 6.5 x 6.2 cm on the right and 4.1 x 2.1 x 3.5 cm on the left.  The ovarian tissue is difficult to delineate from the complex fluid collections however, the adnexa do not appear enlarged.  There is normal arterial and venous flow to the adnexal regions without evidence of torsion.    Free Fluid:    Complex free fluid throughout the pelvis separate from these adnexal fluid collections.      Impression       Bilateral large complex adnexal fluid collections and complex free fluid throughout the pelvis.  Findings are concerning for tubo-ovarian abscesses versus hemorrhagic cysts.  Clinical correlation and further evaluation as warranted.    Probable endometrial polyp.       OB History   No data available     History reviewed. No pertinent past medical history.  Past Surgical History:   Procedure Laterality Date    SLEEVE GASTROPLASTY      TONSILLECTOMY         PTA Medications   Medication Sig    ondansetron (ZOFRAN-ODT) 4 MG TbDL Take 8 mg  by mouth every 12 (twelve) hours.    oxyCODONE-acetaminophen (PERCOCET) 2.5-325 mg per tablet Take 1 tablet by mouth every 4 (four) hours as needed for Pain.       Review of patient's allergies indicates:   Allergen Reactions    Cefoxitin Hives    Penicillins Rash        Family History     Problem Relation (Age of Onset)    Diabetes Father        Tobacco Use    Smoking status: Never Smoker   Substance and Sexual Activity    Alcohol use: Not on file    Drug use: Not on file    Sexual activity: Not on file     Review of Systems   Constitutional: Negative for chills and fever.   Eyes: Negative for visual disturbance.   Respiratory: Negative for shortness of breath.    Cardiovascular: Negative for chest pain and palpitations.   Gastrointestinal: Negative for abdominal pain, constipation, diarrhea, nausea and vomiting.   Genitourinary: Negative for vaginal bleeding and vaginal discharge.   Musculoskeletal: Negative for back pain.   Integumentary:  Negative for rash.   Neurological: Negative for seizures, syncope and headaches.   Hematological: Does not bruise/bleed easily.   Psychiatric/Behavioral: Negative for depression. The patient is not nervous/anxious.       Objective:     Vital Signs (Most Recent):  Temp: 97.4 °F (36.3 °C) (06/03/19 0537)  Pulse: (!) 57 (06/03/19 0537)  Resp: 18 (06/03/19 0537)  BP: (!) 107/55 (06/03/19 0537)  SpO2: 97 % (06/03/19 0537) Vital Signs (24h Range):  Temp:  [97.4 °F (36.3 °C)-98.2 °F (36.8 °C)] 97.4 °F (36.3 °C)  Pulse:  [57-98] 57  Resp:  [14-19] 18  SpO2:  [97 %-100 %] 97 %  BP: (107-150)/(55-86) 107/55     Weight: 96 kg (211 lb 10.3 oz)  Body mass index is 37.49 kg/m².    Patient's last menstrual period was 05/25/2019.    Physical Exam:   Constitutional: She is oriented to person, place, and time. She appears well-developed and well-nourished. No distress.    HENT:   Head: Normocephalic and atraumatic.   Nose: No epistaxis.    Eyes: Conjunctivae and EOM are normal.    Neck:  Normal range of motion. Neck supple. No thyromegaly present.    Cardiovascular: Normal rate and regular rhythm.     Pulmonary/Chest: Effort normal. No respiratory distress.        Abdominal: Soft. She exhibits no distension. There is tenderness (mild lower abdominal tenderness).             Musculoskeletal: Normal range of motion and moves all extremeties. She exhibits no edema or tenderness.       Neurological: She is alert and oriented to person, place, and time.    Skin: Skin is warm and dry. No rash noted.    Psychiatric: She has a normal mood and affect. Her behavior is normal.       Laboratory:  CBC:   Recent Labs   Lab 06/03/19  0505   WBC 5.29   RBC 3.99*   HGB 11.1*   HCT 33.8*      MCV 85   MCH 27.8   MCHC 32.8       Diagnostic Results:  Labs: Reviewed

## 2019-06-03 NOTE — PLAN OF CARE
Problem: Adult Inpatient Plan of Care  Goal: Plan of Care Review  Recommendation/Intervention: Continue regular diet.  Goals: Meet >85% EEN and EPN daily.

## 2019-06-03 NOTE — PROGRESS NOTES
"[facility]  Adult Nutrition  Progress Note    SUMMARY       Recommendations  Recommendation/Intervention: Continue regular diet.  Goals: Meet >85% EEN and EPN daily.  Nutrition Goal Status: new  Communication of RUT Recs: discussed on rounds    Comments: Pt has a sleeve gastroplasty 6 months ago in Nov 2018 and reports 50# intentional wt loss since then, eating a 1200 yudith/day diet, a mostly higher fat and protein, lower CHO diet has been best. NFPE not indicated. Pt reports no n/v and has been able to tolerate diet since diet advanced to regular, 100% intake.    Reason for Assessment  Reason For Assessment: identified at risk by screening criteria  Diagnosis: other (see comments)(abdominal pain, hematuria)  Relevant Medical History: sleeve gastroplasty 6 months ago  Interdisciplinary Rounds: attended   Nutrition Discharge Planning: d/c on regular diet    Nutrition Risk Screen  Nutrition Risk Screen: no indicators present    Nutrition/Diet History  Spiritual, Cultural Beliefs, Quaker Practices, Values that Affect Care: no    Anthropometrics  Temp: 97.3 °F (36.3 °C)  Height Method: Stated  Height: 5' 3" (160 cm)  Height (inches): 63 in  Weight Method: Bed Scale  Weight: 96 kg (211 lb 10.3 oz)  Weight (lb): 211.64 lb  Ideal Body Weight (IBW), Female: 115 lb  % Ideal Body Weight, Female (lb): 184.03 lb  BMI (Calculated): 37.6  BMI Grade: 35 - 39.9 - obesity - grade II     Lab/Procedures/Meds  Pertinent Labs Reviewed: reviewed  Pertinent Labs Comments: H/H: 11.1/33.8 (L)  Pertinent Medications Reviewed: reviewed  Pertinent Medications Comments: famotidine, clindamycin   Scheduled Meds:   clindamycin (CLEOCIN) IVPB  900 mg Intravenous Q8H    famotidine (PF)  20 mg Intravenous BID    gentamicin  5 mg/kg (Adjusted) Intravenous Q24H     Estimated/Assessed Needs  Weight Used For Calorie Calculations: 96 kg (211 lb 10.3 oz)  Energy Calorie Requirements (kcal): 2091  Energy Need Method: [place](x1.3)  Protein Requirements: 96-115g/day(1-1.2g/kg)  Weight Used For Protein Calculations: 96 kg (211 lb 10.3 oz)  Fluid Requirements (mL): 2091  Estimated Fluid Requirement Method: RDA Method  RDA Method (mL): 2091     Nutrition Prescription Ordered  Current Diet Order: Regular    Evaluation of Received Nutrient/Fluid Intake   % Intake of Estimated Energy Needs: 75 - 100 %  % Meal Intake: 75 - 100 %    Nutrition Risk  Level of Risk/Frequency of Follow-up: low     Assessment and Plan  No nutrition diagnosis at this time.     Monitor and Evaluation  Food and Nutrient Intake: energy intake, food and beverage intake  Food and Nutrient Adminstration: diet order  Knowledge/Beliefs/Attitudes: food and nutrition knowledge/skill, beliefs and attitudes  Physical Activity and Function: nutrition-related ADLs and IADLs  Anthropometric Measurements: weight, weight change, body mass index  Biochemical Data, Medical Tests and Procedures: electrolyte and renal panel, gastrointestinal profile, glucose/endocrine profile, inflammatory profile, lipid profile  Nutrition-Focused Physical Findings: overall appearance, extremities, muscles and bones     Nutrition Follow-Up  RD Follow-up?: Yes

## 2019-06-04 NOTE — PROGRESS NOTES
Therapy with Gentamicin complete and/or consult discontinued by provider.  Per floor RN patient discharged. Pharmacy will sign off, please re-consult as needed.

## 2019-06-06 LAB
BACTERIA BLD CULT: NORMAL
BACTERIA BLD CULT: NORMAL

## 2019-06-25 ENCOUNTER — OFFICE VISIT (OUTPATIENT)
Dept: OBSTETRICS AND GYNECOLOGY | Facility: CLINIC | Age: 39
End: 2019-06-25
Payer: COMMERCIAL

## 2019-06-25 VITALS
BODY MASS INDEX: 35.78 KG/M2 | WEIGHT: 201.94 LBS | SYSTOLIC BLOOD PRESSURE: 118 MMHG | HEIGHT: 63 IN | DIASTOLIC BLOOD PRESSURE: 76 MMHG

## 2019-06-25 DIAGNOSIS — N83.8 RIGHT TUBO-OVARIAN MASS: Primary | ICD-10-CM

## 2019-06-25 PROCEDURE — 99214 OFFICE O/P EST MOD 30 MIN: CPT | Mod: S$GLB,,, | Performed by: OBSTETRICS & GYNECOLOGY

## 2019-06-25 PROCEDURE — 99999 PR PBB SHADOW E&M-EST. PATIENT-LVL III: CPT | Mod: PBBFAC,,, | Performed by: OBSTETRICS & GYNECOLOGY

## 2019-06-25 PROCEDURE — 99214 PR OFFICE/OUTPT VISIT, EST, LEVL IV, 30-39 MIN: ICD-10-PCS | Mod: S$GLB,,, | Performed by: OBSTETRICS & GYNECOLOGY

## 2019-06-25 PROCEDURE — 99999 PR PBB SHADOW E&M-EST. PATIENT-LVL III: ICD-10-PCS | Mod: PBBFAC,,, | Performed by: OBSTETRICS & GYNECOLOGY

## 2019-06-25 RX ORDER — LEVONORGESTREL AND ETHINYL ESTRADIOL 90; 20 UG/1; UG/1
1 TABLET ORAL DAILY
Qty: 30 TABLET | Refills: 11 | Status: SHIPPED | OUTPATIENT
Start: 2019-06-25 | End: 2019-08-07

## 2019-06-25 RX ORDER — IBUPROFEN 600 MG/1
TABLET ORAL
Refills: 0 | COMMUNITY
Start: 2019-05-31 | End: 2019-10-03 | Stop reason: SDUPTHER

## 2019-06-25 RX ORDER — OXYCODONE AND ACETAMINOPHEN 5; 325 MG/1; MG/1
TABLET ORAL
Refills: 0 | COMMUNITY
Start: 2019-05-31 | End: 2019-06-25

## 2019-06-27 NOTE — PROGRESS NOTES
Subjective:       Patient ID: Sheryl Klein is a 38 y.o. female.    Chief Complaint:  Follow-up      History of Present Illness  HPI  Pt is 38 y.o. with Patient's last menstrual period was 2019. here in follow up after recent hospitalization.  Note that at the time hospitalization, patient was diagnosed with tubo-ovarian abscesses bilaterally. It was attempted to be drained with interventional Radiology which was unsuccessful.  She was discharged home with a full course of antibiotics.  She has now finished antibiotics and notes that her pain is slightly improved.  But this is been going on for approximately 4 months.  She still has fullness on the right side greater than left.  Patient is concerned that the mass is still there and that she may have to get readmitted if the mass does rupture.    GYN & OB History  Patient's last menstrual period was 2019.   Date of Last Pap: No result found    OB History    Para Term  AB Living   3 2 2   1     SAB TAB Ectopic Multiple Live Births   1              # Outcome Date GA Lbr Glen/2nd Weight Sex Delivery Anes PTL Lv   3 SAB            2 Term      Vag-Spont      1 Term      Vag-Spont          Review of Systems  Review of Systems   Constitutional: Negative for activity change, appetite change and fatigue.   HENT: Negative.  Negative for tinnitus.    Eyes: Negative.    Respiratory: Negative for cough and shortness of breath.    Cardiovascular: Negative for chest pain and palpitations.   Gastrointestinal: Negative.  Negative for abdominal pain, blood in stool, constipation, diarrhea and nausea.   Endocrine: Negative.  Negative for hot flashes.   Genitourinary: Positive for pelvic pain. Negative for dysmenorrhea, dyspareunia, dysuria, frequency, menstrual problem, vaginal discharge, urinary incontinence and postcoital bleeding.   Musculoskeletal: Negative for back pain and joint swelling.   Integumentary:  Negative for rash, breast mass, nipple  discharge and breast skin changes.   Neurological: Negative.  Negative for headaches.   Hematological: Negative.  Does not bruise/bleed easily.   Psychiatric/Behavioral: The patient is not nervous/anxious.    Breast: negative.  Negative for mass, nipple discharge and skin changes          Objective:    Physical Exam:   Constitutional: She is oriented to person, place, and time. She appears well-developed and well-nourished. No distress.    HENT:   Head: Normocephalic and atraumatic.    Eyes: Pupils are equal, round, and reactive to light. Conjunctivae and lids are normal.    Neck: Normal range of motion and full passive range of motion without pain. Neck supple.    Cardiovascular: Normal rate and regular rhythm.  Exam reveals no edema.     Pulmonary/Chest: Effort normal and breath sounds normal. She has no wheezes.        Abdominal: Soft. Normal appearance and bowel sounds are normal. She exhibits no distension. There is no tenderness. There is no rebound and no guarding.     Genitourinary: Vagina normal and uterus normal. Pelvic exam was performed with patient supine. There is no tenderness or lesion on the right labia. There is no tenderness or lesion on the left labia. Uterus is not deviated, not fixed and not hosting fibroids. Cervix is normal. Right adnexum displays fullness. Right adnexum displays no mass and no tenderness. Left adnexum displays fullness. Left adnexum displays no mass and no tenderness. No rectocele, cystocele or unspecified prolapse of vaginal walls in the vagina. No vaginal discharge found. Labial bartholins normal.Cervix exhibits no motion tenderness and no discharge.   Genitourinary Comments: approx 8cm mass still palpated on right side           Musculoskeletal: Normal range of motion and moves all extremeties.       Neurological: She is alert and oriented to person, place, and time. She has normal strength.    Skin: Skin is warm and dry.    Psychiatric: She has a normal mood and affect.  Her speech is normal and behavior is normal. Thought content normal. Her mood appears not anxious. She does not exhibit a depressed mood. She expresses no suicidal plans and no homicidal plans.          Assessment:        1. Right tubo-ovarian mass                Plan:      Sheryl was seen today for follow-up.    Diagnoses and all orders for this visit:    Right tubo-ovarian mass  -     US Pelvis Complete Non OB; Future  -     levonorgestrel-ethinyl estrad (LYBREL) 90-20 mcg (28) per tablet; Take 1 tablet by mouth once daily.  We had a long discussion today about the different reasons for ovarian cyst.  Note the patient has lost a significant amount of weight recently and it is likely that she had started ovulating again.  This would most likely explain why she developed a cyst.  We discussed how birth control pills could potentially help prevent new cyst formation but will not help the old cyst resolved.    Will start with an ultrasound approximately 6 weeks from the last 1.  If the ultrasound shows a persistent mass and patient continues to have pain, would then consider laparoscopic removal of the ovarian cyst.  Patient was in agreement.  All questions were answered.

## 2019-07-22 ENCOUNTER — HOSPITAL ENCOUNTER (OUTPATIENT)
Dept: RADIOLOGY | Facility: OTHER | Age: 39
Discharge: HOME | End: 2019-07-22
Attending: OBSTETRICS & GYNECOLOGY
Payer: COMMERCIAL

## 2019-07-22 ENCOUNTER — TELEPHONE (OUTPATIENT)
Dept: OBSTETRICS AND GYNECOLOGY | Facility: CLINIC | Age: 39
End: 2019-07-22

## 2019-07-22 DIAGNOSIS — N83.8 RIGHT TUBO-OVARIAN MASS: ICD-10-CM

## 2019-07-22 PROCEDURE — 76830 US PELVIS COMP WITH TRANSVAG NON-OB (XPD): ICD-10-PCS | Mod: 26,,, | Performed by: RADIOLOGY

## 2019-07-22 PROCEDURE — 76830 TRANSVAGINAL US NON-OB: CPT | Mod: TC

## 2019-07-22 PROCEDURE — 76856 US EXAM PELVIC COMPLETE: CPT | Mod: 26,,, | Performed by: RADIOLOGY

## 2019-07-22 PROCEDURE — 76830 TRANSVAGINAL US NON-OB: CPT | Mod: 26,,, | Performed by: RADIOLOGY

## 2019-07-22 PROCEDURE — 76856 US PELVIS COMP WITH TRANSVAG NON-OB (XPD): ICD-10-PCS | Mod: 26,,, | Performed by: RADIOLOGY

## 2019-07-22 NOTE — TELEPHONE ENCOUNTER
Reviewed u/s results with pt.  Pt has persistent large right ovarian mass with smaller left ovarian cyst.    Pt continues to have pain (since march).  Discussed the different options and pt would like to proceed with laparoscopic bilateral ovarian cystectomy.    Poly, can you reach out to the pt and help coordinate the best surgery date for the both of us?  thanks

## 2019-07-23 ENCOUNTER — TELEPHONE (OUTPATIENT)
Dept: OBSTETRICS AND GYNECOLOGY | Facility: CLINIC | Age: 39
End: 2019-07-23

## 2019-07-23 DIAGNOSIS — N83.8 RIGHT TUBO-OVARIAN MASS: Primary | ICD-10-CM

## 2019-07-23 NOTE — TELEPHONE ENCOUNTER
LVM:  Good Morning Ms Klein, this is Poly from Dr Moore's office. Please call our office back at [phone].    Thank You    Calling Pt to schedule laparoscopic bilateral ovarian cystectomy. - Aug 15, 2019    Asking pt to have surgery at [facility] location  [address] [address]  For Aug 15th as Dr Moore will not be able to schedule at [facility] due to surgery schedule.

## 2019-07-23 NOTE — TELEPHONE ENCOUNTER
----- Message from Aram Lindquist sent at 7/23/2019  3:11 PM CDT -----  Please call pt and schedule her surgery [phone]

## 2019-07-23 NOTE — TELEPHONE ENCOUNTER
Spoke with pt  Pt would like to move forward with laparoscopic bilateral ovarian cystectomy at [facility] on 8/15/19.    Pt scheduled for pre-op.    Pt verbalized understanding.

## 2019-08-06 ENCOUNTER — OFFICE VISIT (OUTPATIENT)
Dept: OBSTETRICS AND GYNECOLOGY | Facility: CLINIC | Age: 39
End: 2019-08-06
Payer: COMMERCIAL

## 2019-08-06 VITALS
SYSTOLIC BLOOD PRESSURE: 118 MMHG | WEIGHT: 201.94 LBS | BODY MASS INDEX: 35.78 KG/M2 | HEIGHT: 63 IN | DIASTOLIC BLOOD PRESSURE: 76 MMHG

## 2019-08-06 DIAGNOSIS — N83.8 RIGHT TUBO-OVARIAN MASS: Primary | ICD-10-CM

## 2019-08-06 PROCEDURE — 99499 UNLISTED E&M SERVICE: CPT | Mod: S$GLB,,, | Performed by: OBSTETRICS & GYNECOLOGY

## 2019-08-06 PROCEDURE — 99999 PR PBB SHADOW E&M-EST. PATIENT-LVL III: CPT | Mod: PBBFAC,,, | Performed by: OBSTETRICS & GYNECOLOGY

## 2019-08-06 PROCEDURE — 99999 PR PBB SHADOW E&M-EST. PATIENT-LVL III: ICD-10-PCS | Mod: PBBFAC,,, | Performed by: OBSTETRICS & GYNECOLOGY

## 2019-08-06 PROCEDURE — 99499 NO LOS: ICD-10-PCS | Mod: S$GLB,,, | Performed by: OBSTETRICS & GYNECOLOGY

## 2019-08-06 RX ORDER — OXYCODONE AND ACETAMINOPHEN 5; 325 MG/1; MG/1
1 TABLET ORAL EVERY 6 HOURS PRN
Qty: 20 TABLET | Refills: 0 | Status: SHIPPED | OUTPATIENT
Start: 2019-08-06 | End: 2019-10-03

## 2019-08-07 NOTE — PROGRESS NOTES
Subjective:       Patient ID: Sheryl Klein is a 38 y.o. female.    Chief Complaint:  Pre-op Exam      History of Present Illness  HPI  Pt is 38 y.o. here in preop for LSC, bilateral ovarian cystectomy.  Pt has long hx of large adnexal mass causing pain.      GYN & OB History  Patient's last menstrual period was 2019 (approximate).   Date of Last Pap: No result found    OB History    Para Term  AB Living   3 2 2   1     SAB TAB Ectopic Multiple Live Births   1              # Outcome Date GA Lbr Glen/2nd Weight Sex Delivery Anes PTL Lv   3 SAB            2 Term      Vag-Spont      1 Term      Vag-Spont          Review of Systems  Review of Systems   Constitutional: Negative for activity change, appetite change and fatigue.   HENT: Negative.  Negative for tinnitus.    Eyes: Negative.    Respiratory: Negative for cough and shortness of breath.    Cardiovascular: Negative for chest pain and palpitations.   Gastrointestinal: Negative.  Negative for abdominal pain, blood in stool, constipation, diarrhea and nausea.   Endocrine: Negative.  Negative for hot flashes.   Genitourinary: Positive for pelvic pain. Negative for dysmenorrhea, dyspareunia, dysuria, frequency, menstrual problem, vaginal discharge, urinary incontinence and postcoital bleeding.   Musculoskeletal: Negative for back pain and joint swelling.   Integumentary:  Negative for rash, breast mass, nipple discharge and breast skin changes.   Neurological: Negative.  Negative for headaches.   Hematological: Negative.  Does not bruise/bleed easily.   Psychiatric/Behavioral: The patient is not nervous/anxious.    Breast: negative.  Negative for mass, nipple discharge and skin changes          Objective:    Physical Exam:   Constitutional: She is oriented to person, place, and time. She appears well-developed and well-nourished. No distress.    HENT:   Head: Normocephalic and atraumatic.    Eyes: Pupils are equal, round, and reactive to light.  Conjunctivae and lids are normal.    Neck: Normal range of motion and full passive range of motion without pain. Neck supple.    Cardiovascular: Normal rate and regular rhythm.  Exam reveals no edema.     Pulmonary/Chest: Effort normal and breath sounds normal. She has no wheezes.        Abdominal: Soft. Normal appearance and bowel sounds are normal. She exhibits no distension. There is no tenderness. There is no rebound and no guarding.             Musculoskeletal: Normal range of motion and moves all extremeties.       Neurological: She is alert and oriented to person, place, and time. She has normal strength.    Skin: Skin is warm and dry.    Psychiatric: She has a normal mood and affect. Her speech is normal and behavior is normal. Thought content normal. Her mood appears not anxious. She does not exhibit a depressed mood. She expresses no suicidal plans and no homicidal plans.          Assessment:        1. Right tubo-ovarian mass                Plan:      Sheryl was seen today for pre-op exam.    Diagnoses and all orders for this visit:    Right tubo-ovarian mass  -     oxyCODONE-acetaminophen (PERCOCET) 5-325 mg per tablet; Take 1 tablet by mouth every 6 (six) hours as needed for Pain.  -     Vital signs; Standing  -     Notify Physician/Vital Signs Parameters Urine output less than 0.5mL/kg/hr (with indwelling catheter) or 30 mL/hr (without indwelling catheter) or blood glucose greater than 200 mg/dL; Standing  -     Notify physician; Standing  -     Notify Physician - Potential Need of Opioid Reversal; Standing  -     Chlorohexidine Gluconate Bath; Standing  -     Full code; Standing  -     Place in Outpatient; Standing  -     Place sequential compression device; Standing  -     Pregnancy, urine rapid; Standing    Factual information regarding the medical condition and the need for laparoscopic bilateral ovarian cystectomy was discussed with the patient, who verbalized understanding. The therapeutic  rationale, benefits, risks and potential complications were discussed, including the possibility of pain, bleeding, infection, loss of function, disfigurement, death or other unforeseen complications. Alternatives to the procedure were discussed (including potential risks, complications and benefits of each). No guarantee was expressed or implied as to the results of the procedure. Informed consent was given, as well as a request to proceed.    Pt aware of chance that we need to remove right ovary due to size of lesion.  But we will work to just remove the ovarian cyst and preserve the ovary.      Other orders  -     IP VTE LOW RISK PATIENT; Standing  -     Ambulate; Standing

## 2019-08-15 PROBLEM — N83.202 CYSTS OF BOTH OVARIES: Status: ACTIVE | Noted: 2019-08-15

## 2019-08-15 PROBLEM — N83.201 CYSTS OF BOTH OVARIES: Status: ACTIVE | Noted: 2019-08-15

## 2019-08-15 PROBLEM — N83.8 RIGHT TUBO-OVARIAN MASS: Status: ACTIVE | Noted: 2019-08-15

## 2019-08-15 PROBLEM — Z98.890 S/P OVARIAN CYSTECTOMY: Status: ACTIVE | Noted: 2019-08-15

## 2019-08-15 PROBLEM — Z87.42 S/P OVARIAN CYSTECTOMY: Status: ACTIVE | Noted: 2019-08-15

## 2019-08-16 ENCOUNTER — TELEPHONE (OUTPATIENT)
Dept: OBSTETRICS AND GYNECOLOGY | Facility: CLINIC | Age: 39
End: 2019-08-16

## 2019-08-16 NOTE — TELEPHONE ENCOUNTER
Spoke with pt    Pt is recovering well from surgery on 8/15/19.  Pt reports sore throat, feeling sore, and a little nauseated.    Pt is urinating with a little pain, but has a good stream.  Pt is passing gas, but has not had a BM.  Pt has not taken pain meds today.    Pt denies any sign of infection, fever, discharge.    Pt scheduled for post op    Pt verbalized understanding

## 2019-09-23 NOTE — PLAN OF CARE
DISCHARGE SUMMARY  To be completed within 30 days of last clinical contact    Verena Alexis : 1977 MRN: [id number]    Date of Intake: 2019 Date of Last Session: 2019    Chief Complaint:  \"Depression and anxiety.\"    Admission Diagnosis:F32.1 Major depressive disorder, single episode, moderate (CMS/HCC)  (primary encounter diagnosis)  F41.1 NATHAN (generalized anxiety disorder)    D/C Diagnosis Codes: F32.1 Major depressive disorder, single episode, moderate (CMS/HCC)  (primary encounter diagnosis)  F41.1 NATHAN (generalized anxiety disorder)    Reason for Discharge:  The above named patient was discharged from my care on 2019 for the following reason(s): This provider is leaving the agency as of 2019.    Treatment Provided:   (check all that apply) Medication (See Medication List)    Summary of Patient Progress Toward Goals in the Treatment Plan:   No progress was made as the patient was only seen for an initial psychiatric evaluation.    Current Outpatient Medications   Medication Sig Dispense Refill   • sertraline (ZOLOFT) 100 MG tablet Take 1 tablet by mouth daily. 30 tablet 2   • Vitamin D, Ergocalciferol, 71539 units capsule Take 1 capsule by mouth 1 day a week. 8 capsule 0   • B Complex Vitamins (VITAMIN B COMPLEX PO) Take 1 tablet by mouth daily.     • Cholecalciferol (VITAMIN D) 2000 units capsule Take 2 capsules by mouth 2 times daily. 30 capsule    • omeprazole (PRILOSEC) 40 MG capsule Take 1 capsule by mouth daily. 90 capsule 1   • ALPRAZolam (XANAX) 0.5 MG tablet Take 1 tablet by mouth nightly as needed for Sleep. 30 tablet 0   • metFORMIN (GLUCOPHAGE-XR) 500 MG 24 hr tablet Take 2 tablets by mouth daily (with breakfast). 60 tablet 2   • hydrOXYzine (ATARAX) 25 MG tablet Take 1 tablet by mouth 4 times daily as needed for Anxiety. 30 tablet 0   • levothyroxine (SYNTHROID, LEVOTHROID) 50 MCG tablet Take 1 tablet by mouth daily. 30 tablet 2     Current Facility-Administered  Problem: Adult Inpatient Plan of Care  Goal: Plan of Care Review  Outcome: Ongoing (interventions implemented as appropriate)  Plan of care reviewed with pt. Pt verbalized understanding. VSS on RA. No complaints of pain. Personal items and call bell within reach. Bed lowered and locked. Pt states she ha no needs at this time. Will continue to monitor.        Medications   Medication Dose Route Frequency Provider Last Rate Last Dose   • hydrOXYzine (ATARAX) tablet 25 mg  25 mg Oral Q4H PRN Nathaly WAGGONER PA-C           Discharge Recommendations:   (Include names and addresses of facilities, persons or programs the patient was referred to following discharge.) Patient is to schedule a medication management follow up with a new psychiatric prescriber at [facility].    Remaining Discharge Needs:   (Include treatment issues not addressed.) No additional treatment recommendations are needed at this time.      CHRISTINE Pierre Date: 9/23/2019   Time: 12:19 PM

## 2019-09-26 ENCOUNTER — TELEPHONE (OUTPATIENT)
Dept: OBSTETRICS AND GYNECOLOGY | Facility: CLINIC | Age: 39
End: 2019-09-26

## 2019-09-26 NOTE — TELEPHONE ENCOUNTER
----- Message from Merissa Joshi sent at 9/26/2019  9:05 AM CDT -----  Contact: BRITTNI ESPAÑA [[id number]]  Name of Who is Calling: BRITTNI ESPAÑA [[id number]]      What is the request in detail: Patient is calling to rescheduled her post op appointment . Pt states that she left a message before and that no one returned her call . Pt is requesting to speak with someone soon .      Can the clinic reply by [facility]: N      What Number to Call Back if not in [facility]: [phone]

## 2019-09-26 NOTE — TELEPHONE ENCOUNTER
Spoke with pt  Pt rescheduled for Dr Moore's first day back in clinic for her Post Op appt    Pt verbalized understading

## 2019-10-03 ENCOUNTER — OFFICE VISIT (OUTPATIENT)
Dept: OBSTETRICS AND GYNECOLOGY | Facility: CLINIC | Age: 39
End: 2019-10-03
Payer: COMMERCIAL

## 2019-10-03 ENCOUNTER — TELEPHONE (OUTPATIENT)
Dept: OBSTETRICS AND GYNECOLOGY | Facility: CLINIC | Age: 39
End: 2019-10-03

## 2019-10-03 VITALS
BODY MASS INDEX: 34.38 KG/M2 | DIASTOLIC BLOOD PRESSURE: 64 MMHG | WEIGHT: 194 LBS | SYSTOLIC BLOOD PRESSURE: 106 MMHG | HEIGHT: 63 IN

## 2019-10-03 DIAGNOSIS — N80.30 ENDOMETRIOSIS OF PELVIC PERITONEUM: ICD-10-CM

## 2019-10-03 DIAGNOSIS — N80.30 ENDOMETRIOSIS OF PELVIC PERITONEUM: Primary | ICD-10-CM

## 2019-10-03 DIAGNOSIS — Z09 POSTOPERATIVE EXAMINATION: Primary | ICD-10-CM

## 2019-10-03 PROBLEM — N70.93 TUBO-OVARIAN ABSCESS: Status: RESOLVED | Noted: 2019-06-01 | Resolved: 2019-10-03

## 2019-10-03 PROBLEM — Z87.42 S/P OVARIAN CYSTECTOMY: Status: RESOLVED | Noted: 2019-08-15 | Resolved: 2019-10-03

## 2019-10-03 PROBLEM — N83.8 RIGHT TUBO-OVARIAN MASS: Status: RESOLVED | Noted: 2019-08-15 | Resolved: 2019-10-03

## 2019-10-03 PROBLEM — Z98.890 S/P OVARIAN CYSTECTOMY: Status: RESOLVED | Noted: 2019-08-15 | Resolved: 2019-10-03

## 2019-10-03 PROBLEM — N83.202 CYSTS OF BOTH OVARIES: Status: RESOLVED | Noted: 2019-08-15 | Resolved: 2019-10-03

## 2019-10-03 PROBLEM — N83.201 CYSTS OF BOTH OVARIES: Status: RESOLVED | Noted: 2019-08-15 | Resolved: 2019-10-03

## 2019-10-03 PROCEDURE — 99024 POSTOP FOLLOW-UP VISIT: CPT | Mod: S$GLB,,, | Performed by: OBSTETRICS & GYNECOLOGY

## 2019-10-03 PROCEDURE — 99999 PR PBB SHADOW E&M-EST. PATIENT-LVL II: ICD-10-PCS | Mod: PBBFAC,,, | Performed by: OBSTETRICS & GYNECOLOGY

## 2019-10-03 PROCEDURE — 99999 PR PBB SHADOW E&M-EST. PATIENT-LVL II: CPT | Mod: PBBFAC,,, | Performed by: OBSTETRICS & GYNECOLOGY

## 2019-10-03 PROCEDURE — 99024 PR POST-OP FOLLOW-UP VISIT: ICD-10-PCS | Mod: S$GLB,,, | Performed by: OBSTETRICS & GYNECOLOGY

## 2019-10-03 NOTE — PROGRESS NOTES
"CC: Postop visit    Sheryl Klein is a 39 y.o. female  post-op from a LSC Excision of endometriosis on 8/15/19.  Patient is doing well postoperatively.  No pain.  No bowel or bladder complaints.  No fever.      The pathology revealed:  benign    Past Medical History:   Diagnosis Date    Ovarian cyst     PONV (postoperative nausea and vomiting)      Past Surgical History:   Procedure Laterality Date    DIAGNOSTIC LAPAROSCOPY Bilateral 8/15/2019    Procedure: LAPAROSCOPY, DIAGNOSTIC;  Surgeon: Basil Moore MD;  Location: [facility] OR;  Service: OB/GYN;  Laterality: Bilateral;    LYSIS OF ADHESIONS  8/15/2019    Procedure: LYSIS, ADHESIONS;  Surgeon: Basil Moore MD;  Location: [facility] OR;  Service: OB/GYN;;    LYSIS OF ADHESIONS OF URETER  8/15/2019    Procedure: URETEROLYSIS;  Surgeon: Basil Moore MD;  Location: [facility] OR;  Service: OB/GYN;;    SLEEVE GASTROPLASTY      TONSILLECTOMY       Family History   Problem Relation Age of Onset    Diabetes Father     Breast cancer Neg Hx     Colon cancer Neg Hx     Ovarian cancer Neg Hx      Social History     Tobacco Use    Smoking status: Never Smoker    Smokeless tobacco: Never Used   Substance Use Topics    Alcohol use: Never     Frequency: Never    Drug use: Never     OB History    Para Term  AB Living   3 2 2   1     SAB TAB Ectopic Multiple Live Births   1              # Outcome Date GA Lbr Glen/2nd Weight Sex Delivery Anes PTL Lv   3 SAB            2 Term      Vag-Spont      1 Term      Vag-Spont          /64   Ht 5' 3" (1.6 m)   Wt 88 kg (194 lb 0.1 oz)   LMP 2019   BMI 34.37 kg/m²     ROS:  GENERAL: No fever, chills, fatigability or weight loss.  VULVAR: No pain, no lesions and no itching.  VAGINAL: No relaxation, no itching, no discharge, no abnormal bleeding and no lesions.  ABDOMEN: No abdominal pain. Denies nausea. Denies vomiting. No diarrhea. No constipation  BREAST: Denies pain. No lumps. No " discharge.  URINARY: No incontinence, no nocturia, no frequency and no dysuria.  CARDIOVASCULAR: No chest pain. No shortness of breath. No leg cramps.  NEUROLOGICAL: No headaches. No vision changes.    PE:   External genitalia: normal general appearance  Abd:  Incision c/d/i  Clinical staff offered to be present for exam: yes       ICD-10-CM ICD-9-CM    1. Postoperative examination Z09 V67.00    2. Endometriosis of pelvic peritoneum N80.3 617.3 elagolix 150 mg Tab.  We had a long discussion about the different tx options for endometriosis.  Discussed different medication options, including ocp's, aygestin, and elagolix.  Will pre-certify for elagolix.  If not covered, will start with ocp's.  R/b/i/a of medications discussed.  All questions answered.         Follow up in 1 year for WWE.

## 2019-10-04 NOTE — TELEPHONE ENCOUNTER
"----- Message from Grey Tate sent at 10/3/2019  5:19 PM CDT -----  Regarding: Douglas Moore and staff,    We received the prescription for Orilissa. The patient would like this sent to her local [facility].    Please send prescription to [id number] - [place], [place] - [address] AT [address] OF [address].      To complete this in EPIC  The original order MUST be discontinued and re-typed as a new prescription with the updated pharmacy listed.     [facility] has already been added to the patients preferred pharmacies.       Uncheck the box that says "send to [facility]" AND [facility].    #please do not  click reorder -- as it will continue to route the rx to [facility] even if the pharmacy is changed.     Please opt the patient out of [facility] when the BPA is fired.     Please inform us if there is anything further we can do to assist.     Thank you,  Grey"

## 2020-01-07 ENCOUNTER — TELEPHONE (OUTPATIENT)
Dept: OBSTETRICS AND GYNECOLOGY | Facility: CLINIC | Age: 40
End: 2020-01-07

## 2020-01-10 ENCOUNTER — TELEPHONE (OUTPATIENT)
Dept: OBSTETRICS AND GYNECOLOGY | Facility: CLINIC | Age: 40
End: 2020-01-10

## 2020-01-10 NOTE — TELEPHONE ENCOUNTER
2nd attempt    LVM    Pt appt for 1/13/2020 needs to be RS to 1/20/2020 as Dr Moore has been called out of town

## 2020-02-17 ENCOUNTER — OFFICE VISIT (OUTPATIENT)
Dept: OBSTETRICS AND GYNECOLOGY | Facility: CLINIC | Age: 40
End: 2020-02-17
Attending: OBSTETRICS & GYNECOLOGY
Payer: COMMERCIAL

## 2020-02-17 VITALS
SYSTOLIC BLOOD PRESSURE: 122 MMHG | DIASTOLIC BLOOD PRESSURE: 80 MMHG | BODY MASS INDEX: 34.53 KG/M2 | HEIGHT: 63 IN | WEIGHT: 194.88 LBS

## 2020-02-17 DIAGNOSIS — J06.0 ACUTE LARYNGOPHARYNGITIS: ICD-10-CM

## 2020-02-17 DIAGNOSIS — N80.30 ENDOMETRIOSIS OF PELVIC PERITONEUM: Primary | ICD-10-CM

## 2020-02-17 PROCEDURE — 99999 PR PBB SHADOW E&M-EST. PATIENT-LVL II: ICD-10-PCS | Mod: PBBFAC,,, | Performed by: OBSTETRICS & GYNECOLOGY

## 2020-02-17 PROCEDURE — 99214 OFFICE O/P EST MOD 30 MIN: CPT | Mod: S$GLB,,, | Performed by: OBSTETRICS & GYNECOLOGY

## 2020-02-17 PROCEDURE — 99999 PR PBB SHADOW E&M-EST. PATIENT-LVL II: CPT | Mod: PBBFAC,,, | Performed by: OBSTETRICS & GYNECOLOGY

## 2020-02-17 PROCEDURE — 99214 PR OFFICE/OUTPT VISIT, EST, LEVL IV, 30-39 MIN: ICD-10-PCS | Mod: S$GLB,,, | Performed by: OBSTETRICS & GYNECOLOGY

## 2020-02-17 RX ORDER — NORETHINDRONE 5 MG/1
5 TABLET ORAL DAILY
Qty: 30 TABLET | Refills: 11 | Status: SHIPPED | OUTPATIENT
Start: 2020-02-17 | End: 2020-03-18

## 2020-02-17 RX ORDER — AZITHROMYCIN 250 MG/1
TABLET, FILM COATED ORAL
Qty: 6 TABLET | Refills: 0 | Status: SHIPPED | OUTPATIENT
Start: 2020-02-17 | End: 2020-02-22

## 2020-02-17 NOTE — PROGRESS NOTES
Subjective:       Patient ID: Sheryl Klein is a 39 y.o. female.    Chief Complaint:  Follow-up      History of Present Illness  HPI  Pt is 39 y.o. with Patient's last menstrual period was 2020 (approximate).  Here in follow-up after starting Orlissa.  She had been doing very well and was pain free.  She started having some side effects of her causing her trouble.  She was having difficulties with sleeping and she was having more mood disturbances.  She decided to take a break for the medication and the side effects went away.  Unfortunately she had return of pain on Wednesday.  The pain lasted for 3 days.  She restarted her orlissa and the symptoms went away.  She is trying to figure out a combination that will help minimize side effects.  She is really happy with the pain relief that she is experiencing.    On a side note, she has been having a acute upper respiratory infection.  Both of her children have been sick.  She has had low-grade fevers at home.  She has had yellow mucus    GYN & OB History  Patient's last menstrual period was 2020 (approximate).   Date of Last Pap: No result found    OB History    Para Term  AB Living   3 2 2   1     SAB TAB Ectopic Multiple Live Births   1              # Outcome Date GA Lbr Glen/2nd Weight Sex Delivery Anes PTL Lv   3 SAB            2 Term      Vag-Spont      1 Term      Vag-Spont          Review of Systems  Review of Systems   Constitutional: Negative for activity change, appetite change and fatigue.   HENT: Negative.  Negative for tinnitus.    Eyes: Negative.    Respiratory: Negative for cough and shortness of breath.    Cardiovascular: Negative for chest pain and palpitations.   Gastrointestinal: Negative.  Negative for abdominal pain, blood in stool, constipation, diarrhea and nausea.   Endocrine: Negative.  Negative for hot flashes.   Genitourinary: Negative for dysmenorrhea, dyspareunia, dysuria, frequency, menstrual problem, pelvic  pain, vaginal discharge, urinary incontinence and postcoital bleeding.   Musculoskeletal: Negative for back pain and joint swelling.   Integumentary:  Negative for rash, breast mass, nipple discharge and breast skin changes.   Neurological: Negative.  Negative for headaches.   Hematological: Negative.  Does not bruise/bleed easily.   Psychiatric/Behavioral: The patient is not nervous/anxious.    Breast: negative.  Negative for mass, nipple discharge and skin changes          Objective:    Physical Exam     def  Assessment:        1. Endometriosis of pelvic peritoneum    2. Acute laryngopharyngitis                Plan:      Sheryl was seen today for follow-up.    Diagnoses and all orders for this visit:    Endometriosis of pelvic peritoneum  -     norethindrone (AYGESTIN) 5 mg Tab; Take 1 tablet (5 mg total) by mouth once daily.  We had a long discussion today lasting approximately 30 min talking about the different ways to take the medications to minimize side effects.  Options include continuing the medication daily and using a different medication to manage the side effects.  Patient would like to avoid that.  She will likely try to take the medication every other day.  We did talk about Aygestin and how that can treat endometriosis instead of the current medication.  She is interested in trying this to see if it helps manage her symptoms without the side effects.  She will keep us posted to how she experience and is able to find the proper combination for her.  She is aware that taking the orlissa every other day is not the FDA approved method.  All questions were answered.      Acute laryngopharyngitis  -     azithromycin (Z-LISS) 250 MG tablet; Take 2 tablets by mouth on day 1; Take 1 tablet by mouth on days 2-5

## 2020-09-15 ENCOUNTER — OFFICE VISIT (OUTPATIENT)
Dept: URGENT CARE | Facility: CLINIC | Age: 40
End: 2020-09-15
Payer: COMMERCIAL

## 2020-09-15 VITALS
TEMPERATURE: 98 F | DIASTOLIC BLOOD PRESSURE: 83 MMHG | HEART RATE: 69 BPM | RESPIRATION RATE: 16 BRPM | WEIGHT: 194 LBS | SYSTOLIC BLOOD PRESSURE: 143 MMHG | HEIGHT: 63 IN | OXYGEN SATURATION: 99 % | BODY MASS INDEX: 34.38 KG/M2

## 2020-09-15 DIAGNOSIS — J02.9 SORE THROAT: ICD-10-CM

## 2020-09-15 DIAGNOSIS — B34.9 VIRAL SYNDROME: Primary | ICD-10-CM

## 2020-09-15 DIAGNOSIS — Z20.828 EXPOSURE TO VIRAL DISEASE: ICD-10-CM

## 2020-09-15 LAB
CTP QC/QA: YES
CTP QC/QA: YES
MOLECULAR STREP A: NEGATIVE
SARS-COV-2 RDRP RESP QL NAA+PROBE: NEGATIVE

## 2020-09-15 PROCEDURE — 99214 PR OFFICE/OUTPT VISIT, EST, LEVL IV, 30-39 MIN: ICD-10-PCS | Mod: S$GLB,CS,, | Performed by: NURSE PRACTITIONER

## 2020-09-15 PROCEDURE — 99214 OFFICE O/P EST MOD 30 MIN: CPT | Mod: S$GLB,CS,, | Performed by: NURSE PRACTITIONER

## 2020-09-15 PROCEDURE — U0002 COVID-19 LAB TEST NON-CDC: HCPCS | Mod: QW,S$GLB,, | Performed by: NURSE PRACTITIONER

## 2020-09-15 PROCEDURE — U0002: ICD-10-PCS | Mod: QW,S$GLB,, | Performed by: NURSE PRACTITIONER

## 2020-09-15 PROCEDURE — 87651 POCT STREP A MOLECULAR: ICD-10-PCS | Mod: QW,S$GLB,, | Performed by: NURSE PRACTITIONER

## 2020-09-15 PROCEDURE — 87651 STREP A DNA AMP PROBE: CPT | Mod: QW,S$GLB,, | Performed by: NURSE PRACTITIONER

## 2020-09-15 NOTE — PROGRESS NOTES
"Subjective:       Patient ID: Sheryl Klein is a 40 y.o. female.    Vitals:  height is 5' 3" (1.6 m) and weight is 88 kg (194 lb). Her tympanic temperature is 98.3 °F (36.8 °C). Her blood pressure is 143/83 (abnormal) and her pulse is 69. Her respiration is 16 and oxygen saturation is 99%.     Chief Complaint: COVID-19 Concerns    Patient states she has been exposed to COVID and Strep throat in the past week.  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      Sinus Problem  This is a new problem. The current episode started in the past 7 days. The problem has been gradually worsening since onset. There has been no fever. Associated symptoms include a sore throat. Pertinent negatives include no chills, congestion, coughing, diaphoresis, ear pain, headaches, hoarse voice, neck pain, shortness of breath, sinus pressure, sneezing or swollen glands. (Fatigue) Past treatments include nothing. The treatment provided no relief.       Constitution: Negative for chills, sweating, fatigue and fever.   HENT: Positive for sore throat. Negative for ear pain, congestion, sinus pain, sinus pressure and voice change.    Neck: Negative for neck pain and painful lymph nodes.   Eyes: Negative for eye redness.   Respiratory: Negative for chest tightness, cough, sputum production, bloody sputum, COPD, shortness of breath, stridor, wheezing and asthma.    Gastrointestinal: Negative for nausea and vomiting.   Musculoskeletal: Negative for muscle ache.   Skin: Negative for rash.   Allergic/Immunologic: Negative for seasonal allergies, asthma and sneezing.   Neurological: Negative for headaches.   Hematologic/Lymphatic: Negative for swollen lymph nodes.       Objective:      Physical Exam   Constitutional: She is oriented to person, place, and time. She appears well-developed. She is cooperative.  Non-toxic appearance. She does not appear ill. No distress.   HENT:   Head: " Normocephalic and atraumatic.   Ears:   Right Ear: Hearing, tympanic membrane, external ear and ear canal normal.   Left Ear: Hearing, tympanic membrane, external ear and ear canal normal.   Nose: Nose normal. No mucosal edema, rhinorrhea or nasal deformity. No epistaxis. Right sinus exhibits no maxillary sinus tenderness and no frontal sinus tenderness. Left sinus exhibits no maxillary sinus tenderness and no frontal sinus tenderness.   Mouth/Throat: Uvula is midline, oropharynx is clear and moist and mucous membranes are normal. No trismus in the jaw. Normal dentition. No uvula swelling. No oropharyngeal exudate, posterior oropharyngeal edema or posterior oropharyngeal erythema.   Eyes: Conjunctivae and lids are normal. No scleral icterus.   Neck: Trachea normal, full passive range of motion without pain and phonation normal. Neck supple. No neck rigidity. No edema and no erythema present.   Cardiovascular: Normal rate, regular rhythm, normal heart sounds and normal pulses.   Pulmonary/Chest: Effort normal and breath sounds normal. No respiratory distress. She has no decreased breath sounds. She has no rhonchi.   Abdominal: Normal appearance.   Musculoskeletal: Normal range of motion.         General: No deformity.   Neurological: She is alert and oriented to person, place, and time. She exhibits normal muscle tone. Coordination normal.   Skin: Skin is warm, dry, intact, not diaphoretic and not pale. Psychiatric: Her speech is normal and behavior is normal. Judgment and thought content normal.   Nursing note and vitals reviewed.        Assessment:       1. Viral syndrome    2. Sore throat    3. Exposure to viral disease        Plan:         Viral syndrome    Sore throat  -     POCT Strep A, Molecular    Exposure to viral disease  -     POCT COVID-19 Rapid Screening          Results for orders placed or performed in visit on 09/15/20   POCT COVID-19 Rapid Screening   Result Value Ref Range    POC Rapid COVID  Negative Negative     Acceptable Yes    POCT Strep A, Molecular   Result Value Ref Range    Molecular Strep A, POC Negative Negative     Acceptable Yes    \  Patient Instructions   1.  Take all medications as directed. If you have been prescribed antibiotics, make sure to complete them.   2.  Rest and keep yourself/patient well hydrated. For adults, it is recommended to drink at least 8-10 glasses of water daily.   3.  For patients above 6 months of age who are not allergic to and are not on anticoagulants, you can alternate Tylenol and Motrin every 4-6 hours for fever above 100.4F and/or pain.  For patients less than 6 months of age, allergic to or intolerant to NSAIDS, have gastritis, gastric ulcers, or history of GI bleeds, are pregnant, or are on anticoagulant therapy, you can take Tylenol every 4 hours as needed for fever above 100.4F and/or pain.   4. You should schedule a follow-up appointment with your Primary Care Provider/Pediatrician for recheck in 2-3 days or as directed at this visit.   5.  If your condition fails to improve in a timely manner, you should receive another evaluation by your Primary Care Provider/Pediatrician to discuss your concerns or return to urgent care for a recheck.  If your condition worsens at any time, you should report immediately to your nearest Emergency Department for further evaluation. **You must understand that you have received Urgent Care treatment only and that you may be released before all of your medical problems are known or treated. You, the patient, are responsible to arrange for follow-up care as instructed.         Viral Syndrome (Adult)  A viral illness may cause a number of symptoms. The symptoms depend on the part of the body that the virus affects. If it settles in your nose, throat, and lungs, it may cause cough, sore throat, congestion, and sometimes headache. If it settles in your stomach and intestinal tract, it may cause  "vomiting and diarrhea. Sometimes it causes vague symptoms like "aching all over," feeling tired, loss of appetite, or fever.  A viral illness usually lasts 1 to 2 weeks, but sometimes it lasts longer. In some cases, a more serious infection can look like a viral syndrome in the first few days of the illness. You may need another exam and additional tests to know the difference. Watch for the warning signs listed below.  Home care  Follow these guidelines for taking care of yourself at home:  · If symptoms are severe, rest at home for the first 2 to 3 days.  · Stay away from cigarette smoke - both your smoke and the smoke from others.  · You may use over-the-counter acetaminophen or ibuprofen for fever, muscle aching, and headache, unless another medicine was prescribed for this. If you have chronic liver or kidney disease or ever had a stomach ulcer or GI bleeding, talk with your doctor before using these medicines. No one who is younger than 18 and ill with a fever should take aspirin. It may cause severe disease or death.  · Your appetite may be poor, so a light diet is fine. Avoid dehydration by drinking 8 to 12 8-ounce glasses of fluids each day. This may include water; orange juice; lemonade; apple, grape, and cranberry juice; clear fruit drinks; electrolyte replacement and sports drinks; and decaffeinated teas and coffee. If you have been diagnosed with a kidney disease, ask your doctor how much and what types of fluids you should drink to prevent dehydration. If you have kidney disease, drinking too much fluid can cause it build up in the your body and be dangerous to your health.  · Over-the-counter remedies won't shorten the length of the illness but may be helpful for cough, sore throat; and nasal and sinus congestion. Don't use decongestants if you have high blood pressure.  Follow-up care  Follow up with your healthcare provider if you do not improve over the next week.  Call 911  Get emergency medical " care if any of the following occur:  · Convulsion  · Feeling weak, dizzy, or like you are going to faint  · Chest pain, shortness of breath, wheezing, or difficulty breathing  When to seek medical advice  Call your healthcare provider right away if any of these occur:  · Cough with lots of colored sputum (mucus) or blood in your sputum  · Chest pain, shortness of breath, wheezing, or difficulty breathing  · Severe headache; face, neck, or ear pain  · Severe, constant pain in the lower right side of your belly (abdominal)  · Continued vomiting (cant keep liquids down)  · Frequent diarrhea (more than 5 times a day); blood (red or black color) or mucus in diarrhea  · Feeling weak, dizzy, or like you are going to faint  · Extreme thirst  · Fever of 100.4°F (38°C) or higher, or as directed by your healthcare provider  Date Last Reviewed: 9/25/2015  © 4729-8045 [email or web address]. [address] [place] [address]. All rights reserved. This information is not intended as a substitute for professional medical care. Always follow your healthcare professional's instructions.

## 2020-09-15 NOTE — LETTER
[address] ? [place], [address] ? Phone [phone] ? Fax [phone]           Return to Work/School    Patient: Sheryl Klein  YOB: 1980   Date: 09/15/2020      To Whom It May Concern:     Sheryl Klein was in contact with/seen in my office on 09/15/2020. COVID-19 is present in our communities across the [facility]. Not all patients are eligible or appropriate to be tested. In this situation, your employee meets the following criteria:     Sheryl Klein has met the criteria for COVID-19 testing and has a NEGATIVE result. The employee can return to work once they are asymptomatic for 24 hours without the use of fever reducing medications (Tylenol, Motrin, etc).     If you have any questions or concerns, or if I can be of further assistance, please do not hesitate to contact me.     Sincerely,      Edda Doe NP

## 2020-09-15 NOTE — PATIENT INSTRUCTIONS
"1.  Take all medications as directed. If you have been prescribed antibiotics, make sure to complete them.   2.  Rest and keep yourself/patient well hydrated. For adults, it is recommended to drink at least 8-10 glasses of water daily.   3.  For patients above 6 months of age who are not allergic to and are not on anticoagulants, you can alternate Tylenol and Motrin every 4-6 hours for fever above 100.4F and/or pain.  For patients less than 6 months of age, allergic to or intolerant to NSAIDS, have gastritis, gastric ulcers, or history of GI bleeds, are pregnant, or are on anticoagulant therapy, you can take Tylenol every 4 hours as needed for fever above 100.4F and/or pain.   4. You should schedule a follow-up appointment with your Primary Care Provider/Pediatrician for recheck in 2-3 days or as directed at this visit.   5.  If your condition fails to improve in a timely manner, you should receive another evaluation by your Primary Care Provider/Pediatrician to discuss your concerns or return to urgent care for a recheck.  If your condition worsens at any time, you should report immediately to your nearest Emergency Department for further evaluation. **You must understand that you have received Urgent Care treatment only and that you may be released before all of your medical problems are known or treated. You, the patient, are responsible to arrange for follow-up care as instructed.         Viral Syndrome (Adult)  A viral illness may cause a number of symptoms. The symptoms depend on the part of the body that the virus affects. If it settles in your nose, throat, and lungs, it may cause cough, sore throat, congestion, and sometimes headache. If it settles in your stomach and intestinal tract, it may cause vomiting and diarrhea. Sometimes it causes vague symptoms like "aching all over," feeling tired, loss of appetite, or fever.  A viral illness usually lasts 1 to 2 weeks, but sometimes it lasts longer. In some " cases, a more serious infection can look like a viral syndrome in the first few days of the illness. You may need another exam and additional tests to know the difference. Watch for the warning signs listed below.  Home care  Follow these guidelines for taking care of yourself at home:  · If symptoms are severe, rest at home for the first 2 to 3 days.  · Stay away from cigarette smoke - both your smoke and the smoke from others.  · You may use over-the-counter acetaminophen or ibuprofen for fever, muscle aching, and headache, unless another medicine was prescribed for this. If you have chronic liver or kidney disease or ever had a stomach ulcer or GI bleeding, talk with your doctor before using these medicines. No one who is younger than 18 and ill with a fever should take aspirin. It may cause severe disease or death.  · Your appetite may be poor, so a light diet is fine. Avoid dehydration by drinking 8 to 12 8-ounce glasses of fluids each day. This may include water; orange juice; lemonade; apple, grape, and cranberry juice; clear fruit drinks; electrolyte replacement and sports drinks; and decaffeinated teas and coffee. If you have been diagnosed with a kidney disease, ask your doctor how much and what types of fluids you should drink to prevent dehydration. If you have kidney disease, drinking too much fluid can cause it build up in the your body and be dangerous to your health.  · Over-the-counter remedies won't shorten the length of the illness but may be helpful for cough, sore throat; and nasal and sinus congestion. Don't use decongestants if you have high blood pressure.  Follow-up care  Follow up with your healthcare provider if you do not improve over the next week.  Call 911  Get emergency medical care if any of the following occur:  · Convulsion  · Feeling weak, dizzy, or like you are going to faint  · Chest pain, shortness of breath, wheezing, or difficulty breathing  When to seek medical advice  Call  your healthcare provider right away if any of these occur:  · Cough with lots of colored sputum (mucus) or blood in your sputum  · Chest pain, shortness of breath, wheezing, or difficulty breathing  · Severe headache; face, neck, or ear pain  · Severe, constant pain in the lower right side of your belly (abdominal)  · Continued vomiting (cant keep liquids down)  · Frequent diarrhea (more than 5 times a day); blood (red or black color) or mucus in diarrhea  · Feeling weak, dizzy, or like you are going to faint  · Extreme thirst  · Fever of 100.4°F (38°C) or higher, or as directed by your healthcare provider  Date Last Reviewed: 9/25/2015  © 3224-3904 [employer]. [address] [place] [address], [place] [address]. All rights reserved. This information is not intended as a substitute for professional medical care. Always follow your healthcare professional's instructions.

## 2021-01-29 ENCOUNTER — CLINICAL SUPPORT (OUTPATIENT)
Dept: URGENT CARE | Facility: CLINIC | Age: 41
End: 2021-01-29
Payer: COMMERCIAL

## 2021-01-29 DIAGNOSIS — Z11.59 ENCOUNTER FOR SCREENING FOR OTHER VIRAL DISEASES: Primary | ICD-10-CM

## 2021-01-29 LAB
CTP QC/QA: YES
SARS-COV-2 RDRP RESP QL NAA+PROBE: NEGATIVE

## 2021-01-29 PROCEDURE — U0002: ICD-10-PCS | Mod: QW,S$GLB,, | Performed by: NURSE PRACTITIONER

## 2021-01-29 PROCEDURE — U0002 COVID-19 LAB TEST NON-CDC: HCPCS | Mod: QW,S$GLB,, | Performed by: NURSE PRACTITIONER

## 2021-05-04 ENCOUNTER — PATIENT MESSAGE (OUTPATIENT)
Dept: RESEARCH | Facility: HOSPITAL | Age: 41
End: 2021-05-04

## 2021-05-10 ENCOUNTER — PATIENT MESSAGE (OUTPATIENT)
Dept: RESEARCH | Facility: HOSPITAL | Age: 41
End: 2021-05-10

## 2021-05-21 ENCOUNTER — OFFICE VISIT (OUTPATIENT)
Dept: URGENT CARE | Facility: CLINIC | Age: 41
End: 2021-05-21
Payer: COMMERCIAL

## 2021-05-21 VITALS
SYSTOLIC BLOOD PRESSURE: 125 MMHG | BODY MASS INDEX: 33.66 KG/M2 | OXYGEN SATURATION: 100 % | WEIGHT: 190 LBS | DIASTOLIC BLOOD PRESSURE: 80 MMHG | HEART RATE: 94 BPM | TEMPERATURE: 97 F

## 2021-05-21 DIAGNOSIS — B96.89 ACUTE BACTERIAL SINUSITIS: ICD-10-CM

## 2021-05-21 DIAGNOSIS — J01.90 ACUTE BACTERIAL SINUSITIS: ICD-10-CM

## 2021-05-21 DIAGNOSIS — H65.02 ACUTE SEROUS OTITIS MEDIA OF LEFT EAR, RECURRENCE NOT SPECIFIED: Primary | ICD-10-CM

## 2021-05-21 PROCEDURE — 99213 PR OFFICE/OUTPT VISIT, EST, LEVL III, 20-29 MIN: ICD-10-PCS | Mod: S$GLB,,, | Performed by: INTERNAL MEDICINE

## 2021-05-21 PROCEDURE — 99213 OFFICE O/P EST LOW 20 MIN: CPT | Mod: S$GLB,,, | Performed by: INTERNAL MEDICINE

## 2021-05-21 RX ORDER — AZITHROMYCIN 250 MG/1
250 TABLET, FILM COATED ORAL DAILY
Qty: 6 TABLET | Refills: 0 | Status: SHIPPED | OUTPATIENT
Start: 2021-05-21 | End: 2021-05-26

## 2021-05-21 RX ORDER — PREDNISONE 10 MG/1
10 TABLET ORAL DAILY
Qty: 5 TABLET | Refills: 0 | Status: SHIPPED | OUTPATIENT
Start: 2021-05-21 | End: 2021-05-26

## 2021-08-10 ENCOUNTER — OFFICE VISIT (OUTPATIENT)
Dept: URGENT CARE | Facility: CLINIC | Age: 41
End: 2021-08-10
Payer: COMMERCIAL

## 2021-08-10 VITALS
OXYGEN SATURATION: 99 % | BODY MASS INDEX: 33.29 KG/M2 | WEIGHT: 195 LBS | SYSTOLIC BLOOD PRESSURE: 126 MMHG | TEMPERATURE: 99 F | HEIGHT: 64 IN | DIASTOLIC BLOOD PRESSURE: 89 MMHG | HEART RATE: 90 BPM | RESPIRATION RATE: 17 BRPM

## 2021-08-10 DIAGNOSIS — H65.02 ACUTE SEROUS OTITIS MEDIA OF LEFT EAR, RECURRENCE NOT SPECIFIED: Primary | ICD-10-CM

## 2021-08-10 DIAGNOSIS — U07.1 COVID-19 VIRUS INFECTION: ICD-10-CM

## 2021-08-10 DIAGNOSIS — U07.1 COVID-19 VIRUS DETECTED: ICD-10-CM

## 2021-08-10 LAB
CTP QC/QA: YES
SARS-COV-2 RDRP RESP QL NAA+PROBE: POSITIVE

## 2021-08-10 PROCEDURE — 99214 OFFICE O/P EST MOD 30 MIN: CPT | Mod: S$GLB,,, | Performed by: INTERNAL MEDICINE

## 2021-08-10 PROCEDURE — U0002: ICD-10-PCS | Mod: QW,CR,S$GLB, | Performed by: INTERNAL MEDICINE

## 2021-08-10 PROCEDURE — 99214 PR OFFICE/OUTPT VISIT, EST, LEVL IV, 30-39 MIN: ICD-10-PCS | Mod: S$GLB,,, | Performed by: INTERNAL MEDICINE

## 2021-08-10 PROCEDURE — U0002 COVID-19 LAB TEST NON-CDC: HCPCS | Mod: QW,CR,S$GLB, | Performed by: INTERNAL MEDICINE

## 2021-08-10 RX ORDER — PREDNISONE 10 MG/1
10 TABLET ORAL DAILY
Qty: 5 TABLET | Refills: 0 | Status: SHIPPED | OUTPATIENT
Start: 2021-08-10 | End: 2021-08-15

## 2021-08-10 RX ORDER — AZITHROMYCIN 250 MG/1
250 TABLET, FILM COATED ORAL DAILY
Qty: 6 TABLET | Refills: 0 | Status: SHIPPED | OUTPATIENT
Start: 2021-08-10 | End: 2021-08-15

## 2021-08-14 ENCOUNTER — NURSE TRIAGE (OUTPATIENT)
Dept: ADMINISTRATIVE | Facility: CLINIC | Age: 41
End: 2021-08-14

## 2023-11-03 ENCOUNTER — HOSPITAL ENCOUNTER (OUTPATIENT)
Dept: RADIOLOGY | Facility: HOSPITAL | Age: 43
Discharge: HOME | End: 2023-11-03
Payer: COMMERCIAL

## 2023-11-03 VITALS — WEIGHT: 195 LBS | HEIGHT: 64 IN | BODY MASS INDEX: 33.29 KG/M2

## 2023-11-03 DIAGNOSIS — Z12.31 ENCOUNTER FOR SCREENING MAMMOGRAM FOR BREAST CANCER: ICD-10-CM

## 2023-11-03 PROCEDURE — 77067 SCR MAMMO BI INCL CAD: CPT | Mod: 26,,, | Performed by: RADIOLOGY

## 2023-11-03 PROCEDURE — 77067 MAMMO DIGITAL SCREENING BILAT WITH TOMO: ICD-10-PCS | Mod: 26,,, | Performed by: RADIOLOGY

## 2023-11-03 PROCEDURE — 77067 SCR MAMMO BI INCL CAD: CPT | Mod: TC

## 2023-11-03 PROCEDURE — 77063 MAMMO DIGITAL SCREENING BILAT WITH TOMO: ICD-10-PCS | Mod: 26,,, | Performed by: RADIOLOGY

## 2023-11-03 PROCEDURE — 77063 BREAST TOMOSYNTHESIS BI: CPT | Mod: 26,,, | Performed by: RADIOLOGY

## 2023-11-12 NOTE — PROGRESS NOTES
Hi Ms Klein  I just wanted you to know that your mammogram was overall normal.  Thanks,  Basil Moore MD

## 2025-03-17 ENCOUNTER — TELEPHONE (OUTPATIENT)
Dept: OBSTETRICS AND GYNECOLOGY | Facility: CLINIC | Age: 45
End: 2025-03-17
Payer: COMMERCIAL

## 2025-03-17 NOTE — TELEPHONE ENCOUNTER
Called pt in regards to scheduling, no answer, lvm asking pt to call office along with possible date/time.

## 2025-03-17 NOTE — TELEPHONE ENCOUNTER
----- Message from Fang sent at 3/17/2025 12:53 PM CDT -----  Regarding: appt request  Name of Who is Calling: Sheryl What is the request in detail: Patient is requesting a call back to schedule WWE appointment. She only want to see him.  Can the clinic reply by [facility]: Yes What [facility] to [facility] [phone]

## 2025-03-18 ENCOUNTER — HOSPITAL ENCOUNTER (OUTPATIENT)
Dept: RADIOLOGY | Facility: HOSPITAL | Age: 45
Discharge: HOME | End: 2025-03-18
Payer: COMMERCIAL

## 2025-03-18 VITALS — WEIGHT: 195 LBS | HEIGHT: 64 IN | BODY MASS INDEX: 33.29 KG/M2

## 2025-03-18 DIAGNOSIS — Z12.31 ENCOUNTER FOR SCREENING MAMMOGRAM FOR BREAST CANCER: ICD-10-CM

## 2025-03-18 PROCEDURE — 77063 BREAST TOMOSYNTHESIS BI: CPT | Mod: TC

## 2025-03-18 PROCEDURE — 77063 BREAST TOMOSYNTHESIS BI: CPT | Mod: 26,,, | Performed by: RADIOLOGY

## 2025-03-18 PROCEDURE — 77067 SCR MAMMO BI INCL CAD: CPT | Mod: 26,,, | Performed by: RADIOLOGY

## 2025-04-21 ENCOUNTER — OFFICE VISIT (OUTPATIENT)
Dept: OBSTETRICS AND GYNECOLOGY | Facility: CLINIC | Age: 45
End: 2025-04-21
Payer: COMMERCIAL

## 2025-04-21 VITALS
DIASTOLIC BLOOD PRESSURE: 68 MMHG | BODY MASS INDEX: 27.43 KG/M2 | SYSTOLIC BLOOD PRESSURE: 110 MMHG | WEIGHT: 160.69 LBS | HEIGHT: 64 IN

## 2025-04-21 DIAGNOSIS — Z12.4 SCREENING FOR CERVICAL CANCER: ICD-10-CM

## 2025-04-21 DIAGNOSIS — Z12.39 BREAST CANCER SCREENING, HIGH RISK PATIENT: ICD-10-CM

## 2025-04-21 DIAGNOSIS — Z01.419 WELL WOMAN EXAM WITH ROUTINE GYNECOLOGICAL EXAM: Primary | ICD-10-CM

## 2025-04-21 DIAGNOSIS — R92.343 EXTREMELY DENSE TISSUE OF BOTH BREASTS ON MAMMOGRAPHY: ICD-10-CM

## 2025-04-21 PROCEDURE — 88175 CYTOPATH C/V AUTO FLUID REDO: CPT | Performed by: OBSTETRICS & GYNECOLOGY

## 2025-04-21 PROCEDURE — 99999 PR PBB SHADOW E&M-EST. PATIENT-LVL III: CPT | Mod: PBBFAC,,, | Performed by: OBSTETRICS & GYNECOLOGY

## 2025-04-21 PROCEDURE — 87624 HPV HI-RISK TYP POOLED RSLT: CPT | Performed by: OBSTETRICS & GYNECOLOGY

## 2025-04-21 RX ORDER — TIRZEPATIDE 10MG/0.2ML
20 SYRINGE (ML) SUBCUTANEOUS
COMMUNITY
Start: 2025-02-20

## 2025-04-21 NOTE — PROGRESS NOTES
Subjective     Patient ID: Sheryl Klein is a 44 y.o. female.    Chief Complaint:  Well Woman      History of Present Illness  HPI  Annual Exam-Premenopausal  Patient presents for annual exam. The patient has no complaints today. The patient is sexually active. GYN screening history: last pap: was normal and last mammogram: was normal. The patient wears seatbelts: yes. The patient participates in regular exercise: yes. Has the patient ever been transfused or tattooed?: yes. The patient reports that there is not domestic violence in her life.    Pt recently tried vaginal hrt to see if that helped with her insomnia.  It did but she is curious about risks of starting oral HRT given family hx of breast ca (her last MMG had TC score of 29%)  GYN & OB History  Patient's last menstrual period was 2025 (approximate).   Date of Last Pap: No result found    OB History    Para Term  AB Living   3 2 2  1    SAB IAB Ectopic Multiple Live Births   1          # Outcome Date GA Lbr Glen/2nd Weight Sex Type Anes PTL Lv   3 SAB            2 Term      Vag-Spont      1 Term      Vag-Spont          Review of Systems  Review of Systems   Constitutional:  Negative for activity change, appetite change and fatigue.   HENT: Negative.  Negative for tinnitus.    Eyes: Negative.    Respiratory:  Negative for cough and shortness of breath.    Cardiovascular:  Negative for chest pain and palpitations.   Gastrointestinal: Negative.  Negative for abdominal pain, blood in stool, constipation, diarrhea and nausea.   Endocrine: Negative.  Negative for hot flashes.   Genitourinary:  Negative for dysmenorrhea, dyspareunia, dysuria, frequency, menstrual problem, pelvic pain, vaginal discharge, urinary incontinence and postcoital bleeding.   Musculoskeletal:  Negative for back pain and joint swelling.   Integumentary:  Negative for rash, breast mass, nipple discharge and breast skin changes.   Neurological: Negative.  Negative for  headaches.   Hematological: Negative.  Does not bruise/bleed easily.   Psychiatric/Behavioral:  The patient is not nervous/anxious.    Breast: negative.  Negative for mass, nipple discharge and skin changes         Objective   Physical Exam:   Constitutional: Vital signs are normal. She appears well-developed and well-nourished. She is active and cooperative. She does not appear ill. No distress.    HENT:   Head: Normocephalic and atraumatic. Mouth/Throat: Mucous membranes are normal.    Eyes: Pupils are equal, round, and reactive to light. Conjunctivae, EOM and lids are normal.    Neck: No thyroid mass and no thyromegaly present.    Cardiovascular:  Normal rate, regular rhythm, S1 normal, S2 normal and normal pulses.             Pulmonary/Chest: Effort normal. No accessory muscle usage. Right breast exhibits mass. Right breast exhibits no inverted nipple, no nipple discharge, no skin change, no tenderness and no swelling. Left breast exhibits mass. Left breast exhibits no inverted nipple, no nipple discharge, no skin change, no tenderness and no swelling.   Extremely dense breast bilaterally        Abdominal: Soft. Bowel sounds are normal. She exhibits no distension and no mass. There is no abdominal tenderness. There is no rebound and no guarding.     Genitourinary:    Vagina and uterus normal.      Pelvic exam was performed with patient supine.   The external female genitalia was normal.     Labial bartholins normal.There is no tenderness or injury on the right labia. There is no tenderness or injury on the left labia. Cervix is normal. Right adnexum displays no mass and no fullness. Left adnexum displays no mass and no fullness. No erythema, vaginal discharge, rectocele or cystocele in the vagina. Cervix exhibits no motion tenderness and no discharge. Uterus is not deviated and not hosting fibroids. Normal urethral meatus.Urethra findings: no tendernessBladder findings: no bladder tenderness   Genitourinary  Comments: A female chaperone was present for the entire exam                 Neurological: She is alert. She has normal strength.    Skin: Skin is warm and dry.    Psychiatric: She has a normal mood and affect. Her behavior is normal. Thought content normal. Her mood appears not anxious. Her affect is not inappropriate. Her speech is not slurred. She is not agitated and not aggressive. Thought content is not paranoid. She expresses no suicidal plans and no homicidal plans.            Assessment and Plan     1. Well woman exam with routine gynecological exam    2. Screening for cervical cancer    3. Breast cancer screening, high risk patient    4. Extremely dense tissue of both breasts on mammography             Plan:  Sheryl was seen today for well woman.    Diagnoses and all orders for this visit:    Well woman exam with routine gynecological exam  MMG up to date  Would consider low dose HRT to help with perimenopause transition if breast screening is negative.    Screening for cervical cancer  -     Liquid-Based Pap Smear, Screening    Breast cancer screening, high risk patient  -     Ambulatory referral/consult to Breast Surgery; Future    Extremely dense tissue of both breasts on mammography  -     Ambulatory referral/consult to Breast Surgery; Future

## 2025-04-30 ENCOUNTER — RESULTS FOLLOW-UP (OUTPATIENT)
Dept: OBSTETRICS AND GYNECOLOGY | Facility: HOSPITAL | Age: 45
End: 2025-04-30

## (undated) DEVICE — NDL TUOHY EPIDURAL 20G X 3.5

## (undated) DEVICE — GLOVE PROTEXIS PI CLASSIC 7.5

## (undated) DEVICE — Device